# Patient Record
Sex: MALE | Race: OTHER | HISPANIC OR LATINO | URBAN - METROPOLITAN AREA
[De-identification: names, ages, dates, MRNs, and addresses within clinical notes are randomized per-mention and may not be internally consistent; named-entity substitution may affect disease eponyms.]

---

## 2021-07-31 ENCOUNTER — EMERGENCY (EMERGENCY)
Facility: HOSPITAL | Age: 41
LOS: 0 days | Discharge: HOME | End: 2021-07-31
Attending: EMERGENCY MEDICINE
Payer: MEDICAID

## 2021-07-31 VITALS
TEMPERATURE: 99 F | OXYGEN SATURATION: 97 % | DIASTOLIC BLOOD PRESSURE: 97 MMHG | SYSTOLIC BLOOD PRESSURE: 161 MMHG | HEART RATE: 66 BPM | RESPIRATION RATE: 18 BRPM

## 2021-07-31 VITALS
SYSTOLIC BLOOD PRESSURE: 152 MMHG | DIASTOLIC BLOOD PRESSURE: 97 MMHG | HEART RATE: 63 BPM | OXYGEN SATURATION: 98 % | TEMPERATURE: 97 F | RESPIRATION RATE: 16 BRPM

## 2021-07-31 DIAGNOSIS — K29.70 GASTRITIS, UNSPECIFIED, WITHOUT BLEEDING: ICD-10-CM

## 2021-07-31 DIAGNOSIS — R10.9 UNSPECIFIED ABDOMINAL PAIN: ICD-10-CM

## 2021-07-31 LAB
ALBUMIN SERPL ELPH-MCNC: 4.1 G/DL — SIGNIFICANT CHANGE UP (ref 3.3–5)
ALP SERPL-CCNC: 67 U/L — SIGNIFICANT CHANGE UP (ref 40–120)
ALT FLD-CCNC: 39 U/L — SIGNIFICANT CHANGE UP (ref 12–78)
ANION GAP SERPL CALC-SCNC: 4 MMOL/L — LOW (ref 5–17)
APTT BLD: 31.5 SEC — SIGNIFICANT CHANGE UP (ref 27.5–35.5)
AST SERPL-CCNC: 22 U/L — SIGNIFICANT CHANGE UP (ref 15–37)
BASOPHILS # BLD AUTO: 0.02 K/UL — SIGNIFICANT CHANGE UP (ref 0–0.2)
BASOPHILS NFR BLD AUTO: 0.2 % — SIGNIFICANT CHANGE UP (ref 0–2)
BILIRUB SERPL-MCNC: 0.9 MG/DL — SIGNIFICANT CHANGE UP (ref 0.2–1.2)
BUN SERPL-MCNC: 7 MG/DL — SIGNIFICANT CHANGE UP (ref 7–23)
CALCIUM SERPL-MCNC: 9 MG/DL — SIGNIFICANT CHANGE UP (ref 8.5–10.1)
CHLORIDE SERPL-SCNC: 106 MMOL/L — SIGNIFICANT CHANGE UP (ref 96–108)
CO2 SERPL-SCNC: 29 MMOL/L — SIGNIFICANT CHANGE UP (ref 22–31)
CREAT SERPL-MCNC: 0.95 MG/DL — SIGNIFICANT CHANGE UP (ref 0.5–1.3)
EOSINOPHIL # BLD AUTO: 0.03 K/UL — SIGNIFICANT CHANGE UP (ref 0–0.5)
EOSINOPHIL NFR BLD AUTO: 0.4 % — SIGNIFICANT CHANGE UP (ref 0–6)
GLUCOSE SERPL-MCNC: 107 MG/DL — HIGH (ref 70–99)
HCT VFR BLD CALC: 43 % — SIGNIFICANT CHANGE UP (ref 39–50)
HGB BLD-MCNC: 14.9 G/DL — SIGNIFICANT CHANGE UP (ref 13–17)
IMM GRANULOCYTES NFR BLD AUTO: 0.2 % — SIGNIFICANT CHANGE UP (ref 0–1.5)
INR BLD: 1.1 RATIO — SIGNIFICANT CHANGE UP (ref 0.88–1.16)
LIDOCAIN IGE QN: 78 U/L — SIGNIFICANT CHANGE UP (ref 73–393)
LYMPHOCYTES # BLD AUTO: 0.78 K/UL — LOW (ref 1–3.3)
LYMPHOCYTES # BLD AUTO: 9.3 % — LOW (ref 13–44)
MCHC RBC-ENTMCNC: 27.7 PG — SIGNIFICANT CHANGE UP (ref 27–34)
MCHC RBC-ENTMCNC: 34.7 GM/DL — SIGNIFICANT CHANGE UP (ref 32–36)
MCV RBC AUTO: 80.1 FL — SIGNIFICANT CHANGE UP (ref 80–100)
MONOCYTES # BLD AUTO: 0.74 K/UL — SIGNIFICANT CHANGE UP (ref 0–0.9)
MONOCYTES NFR BLD AUTO: 8.8 % — SIGNIFICANT CHANGE UP (ref 2–14)
NEUTROPHILS # BLD AUTO: 6.81 K/UL — SIGNIFICANT CHANGE UP (ref 1.8–7.4)
NEUTROPHILS NFR BLD AUTO: 81.1 % — HIGH (ref 43–77)
PLATELET # BLD AUTO: 168 K/UL — SIGNIFICANT CHANGE UP (ref 150–400)
POTASSIUM SERPL-MCNC: 3.3 MMOL/L — LOW (ref 3.5–5.3)
POTASSIUM SERPL-SCNC: 3.3 MMOL/L — LOW (ref 3.5–5.3)
PROT SERPL-MCNC: 7.9 GM/DL — SIGNIFICANT CHANGE UP (ref 6–8.3)
PROTHROM AB SERPL-ACNC: 12.8 SEC — SIGNIFICANT CHANGE UP (ref 10.6–13.6)
RBC # BLD: 5.37 M/UL — SIGNIFICANT CHANGE UP (ref 4.2–5.8)
RBC # FLD: 13 % — SIGNIFICANT CHANGE UP (ref 10.3–14.5)
SODIUM SERPL-SCNC: 139 MMOL/L — SIGNIFICANT CHANGE UP (ref 135–145)
WBC # BLD: 8.4 K/UL — SIGNIFICANT CHANGE UP (ref 3.8–10.5)
WBC # FLD AUTO: 8.4 K/UL — SIGNIFICANT CHANGE UP (ref 3.8–10.5)

## 2021-07-31 PROCEDURE — 85025 COMPLETE CBC W/AUTO DIFF WBC: CPT

## 2021-07-31 PROCEDURE — G1004: CPT

## 2021-07-31 PROCEDURE — 86901 BLOOD TYPING SEROLOGIC RH(D): CPT

## 2021-07-31 PROCEDURE — 85730 THROMBOPLASTIN TIME PARTIAL: CPT

## 2021-07-31 PROCEDURE — 74177 CT ABD & PELVIS W/CONTRAST: CPT | Mod: 26,ME

## 2021-07-31 PROCEDURE — 83690 ASSAY OF LIPASE: CPT

## 2021-07-31 PROCEDURE — 96374 THER/PROPH/DIAG INJ IV PUSH: CPT | Mod: XU

## 2021-07-31 PROCEDURE — 74177 CT ABD & PELVIS W/CONTRAST: CPT

## 2021-07-31 PROCEDURE — 80053 COMPREHEN METABOLIC PANEL: CPT

## 2021-07-31 PROCEDURE — 86850 RBC ANTIBODY SCREEN: CPT

## 2021-07-31 PROCEDURE — 99285 EMERGENCY DEPT VISIT HI MDM: CPT

## 2021-07-31 PROCEDURE — 99284 EMERGENCY DEPT VISIT MOD MDM: CPT | Mod: 25

## 2021-07-31 PROCEDURE — 86900 BLOOD TYPING SEROLOGIC ABO: CPT

## 2021-07-31 PROCEDURE — 85610 PROTHROMBIN TIME: CPT

## 2021-07-31 PROCEDURE — 36415 COLL VENOUS BLD VENIPUNCTURE: CPT

## 2021-07-31 PROCEDURE — 96375 TX/PRO/DX INJ NEW DRUG ADDON: CPT

## 2021-07-31 RX ORDER — KETOROLAC TROMETHAMINE 30 MG/ML
30 SYRINGE (ML) INJECTION ONCE
Refills: 0 | Status: DISCONTINUED | OUTPATIENT
Start: 2021-07-31 | End: 2021-07-31

## 2021-07-31 RX ORDER — FAMOTIDINE 10 MG/ML
20 INJECTION INTRAVENOUS ONCE
Refills: 0 | Status: COMPLETED | OUTPATIENT
Start: 2021-07-31 | End: 2021-07-31

## 2021-07-31 RX ORDER — ONDANSETRON 8 MG/1
4 TABLET, FILM COATED ORAL ONCE
Refills: 0 | Status: COMPLETED | OUTPATIENT
Start: 2021-07-31 | End: 2021-07-31

## 2021-07-31 RX ORDER — POTASSIUM CHLORIDE 20 MEQ
40 PACKET (EA) ORAL DAILY
Refills: 0 | Status: DISCONTINUED | OUTPATIENT
Start: 2021-07-31 | End: 2021-07-31

## 2021-07-31 RX ORDER — SODIUM CHLORIDE 9 MG/ML
1000 INJECTION INTRAMUSCULAR; INTRAVENOUS; SUBCUTANEOUS ONCE
Refills: 0 | Status: COMPLETED | OUTPATIENT
Start: 2021-07-31 | End: 2021-07-31

## 2021-07-31 RX ADMIN — Medication 30 MILLIGRAM(S): at 15:09

## 2021-07-31 RX ADMIN — ONDANSETRON 4 MILLIGRAM(S): 8 TABLET, FILM COATED ORAL at 15:09

## 2021-07-31 RX ADMIN — SODIUM CHLORIDE 1000 MILLILITER(S): 9 INJECTION INTRAMUSCULAR; INTRAVENOUS; SUBCUTANEOUS at 15:09

## 2021-07-31 RX ADMIN — FAMOTIDINE 20 MILLIGRAM(S): 10 INJECTION INTRAVENOUS at 15:09

## 2021-07-31 RX ADMIN — Medication 40 MILLIEQUIVALENT(S): at 18:17

## 2021-07-31 RX ADMIN — SODIUM CHLORIDE 1000 MILLILITER(S): 9 INJECTION INTRAMUSCULAR; INTRAVENOUS; SUBCUTANEOUS at 18:18

## 2021-07-31 NOTE — ED STATDOCS - CLINICAL SUMMARY MEDICAL DECISION MAKING FREE TEXT BOX
cellulitis of left buttock, no abscess. Pt non toxic, will DC on PO abx. warm compresses. f/u [facility] [place] for re-eval and repeat labs. upper abd pain, now resolved, likely gastritis. f/u with outpt [facility].

## 2021-07-31 NOTE — ED STATDOCS - PATIENT PORTAL LINK FT
You can access the FollowMyHealth Patient Portal offered by [facility] by registering at the following website: [email or web address]. By joining [employer]’s FollowMyHealth portal, you will also be able to view your health information using other applications (apps) compatible with our system.

## 2021-07-31 NOTE — ED STATDOCS - CARE PLAN
Principal Discharge DX:	Cellulitis of buttock  Secondary Diagnosis:	Buttock pain   Principal Discharge DX:	Abdominal pain  Secondary Diagnosis:	Gastritis

## 2021-07-31 NOTE — ED STATDOCS - PROGRESS NOTE DETAILS
signed Terri Vanegas PA-C Pt seen initially in intake by Dr Wolf.  ID [id number]  40M c/o upper abd pain since 1am last night. Denies fever/ N/V/D. Pt says he had some kind of fistula surgery years ago but he isn't sure from what. Pt in obvious pain, +upper abd TTP. Denies urinary symptoms. Plan labs, CT, analgesia. Pt agrees with plan of  care. No PMD. signed Terri Vanegas PA-C   CT no drainable abscess. labs notable for elevated creatinine, lactate 2.1. Will start pt on PO abx, keflex/doxy. outpt f/u [facility] for re-eval and repeat labs. return precautions given. Pt feeling well at DC, agrees with DC and plan of care. signed Terri Vanegas PA-C  ID signed Terri Vanegas PA-C  ID [id number]  No significant findings on labwork or imaging. pt feeling much better after meds. pt would like to DC home. abdomen soft, non-tender. pain possibly due to gastritis. recommend OTC pepcid, f/u [facility]. Pt feeling well at DC, agrees with DC and plan of care.

## 2021-07-31 NOTE — ED STATDOCS - NSFOLLOWUPINSTRUCTIONS_ED_ALL_ED_FT
Cellulitis    WHAT YOU NEED TO KNOW:    Cellulitis is a skin infection caused by bacteria. Cellulitis may go away on its own or you may need treatment. Your healthcare provider may draw a [place] around the outside edges of your cellulitis. If your cellulitis spreads, your healthcare provider will see it outside of the [place]. Cellulitis          DISCHARGE INSTRUCTIONS:    Call 911 if:     You have sudden trouble breathing or chest pain.        Return to the emergency department if:     Your wound gets larger and more painful.       You feel a crackling under your skin when you touch it.      You have purple dots or bumps on your skin, or you see bleeding under your skin.      You have new swelling and pain in your legs.      The red, warm, swollen area gets larger.      You see red streaks coming from the infected area.    Contact your healthcare provider if:     You have a fever.      Your fever or pain does not go away or gets worse.      The area does not get smaller after 2 days of antibiotics.      Your skin is flaking or peeling off.      You have questions or concerns about your condition or care.    Medicines:     Antibiotics help treat the bacterial infection.       NSAIDs, such as ibuprofen, help decrease swelling, pain, and fever. NSAIDs can cause stomach bleeding or kidney problems in certain people. If you take blood thinner medicine, always ask if NSAIDs are safe for you. Always read the medicine label and follow directions. Do not give these medicines to children under 6 months of age without direction from your child's healthcare provider.      Acetaminophen decreases pain and fever. It is available without a doctor's order. Ask how much to take and how often to take it. Follow directions. Read the labels of all other medicines you are using to see if they also contain acetaminophen, or ask your doctor or pharmacist. Acetaminophen can cause liver damage if not taken correctly. Do not use more than 4 grams (4,000 milligrams) total of acetaminophen in one day.       Take your medicine as directed. Contact your healthcare provider if you think your medicine is not helping or if you have side effects. Tell him or her if you are allergic to any medicine. Keep a list of the medicines, vitamins, and herbs you take. Include the amounts, and when and why you take them. Bring the list or the pill bottles to follow-up visits. Carry your medicine list with you in case of an emergency.    Self-care:     Elevate the area above the level of your heart as often as you can. This will help decrease swelling and pain. Prop the area on pillows or blankets to keep it elevated comfortably.       Clean the area daily until the wound scabs over. Gently wash the area with soap and water. Pat dry. Use dressings as directed.       Place cool or warm, wet cloths on the area as directed. Use clean cloths and clean water. Leave it on the area until the cloth is room temperature. Pat the area dry with a clean, dry cloth. The cloths may help decrease pain.     Prevent cellulitis:     Do not scratch bug bites or areas of injury. You increase your risk for cellulitis by scratching these areas.       Do not share personal items, such as towels, clothing, and razors.       Clean exercise equipment with germ-killing  before and after you use it.      Wash your hands often. Use soap and water. Wash your hands after you use the bathroom, change a child's diapers, or sneeze. Wash your hands before you prepare or eat food. Use lotion to prevent dry, cracked skin. Handwashing           Wear pressure stockings as directed. You may be told to wear the stockings if you have peripheral edema. The stockings improve blood flow and decrease swelling.      Treat athlete’s foot. This can help prevent the spread of a bacterial skin infection.    Follow up with your healthcare provider within 3 days, or as directed: Your healthcare provider will check if your cellulitis is getting better. You may need different medicine. Write down your questions so you remember to ask them during your visits.     FOLLOW UP AT THE [facility] [place] [facility] THIS WEEK FOR RE-EVALUATION AND TO REPEAT YOUR BLOODWORK. YOUR KIDNEY NUMBERS WERE A LITTLE HIGH AND YOUR POTASSIUM WAS A LITTLE LOW. CALL THE OFFICE TO MAKE AN APPOINTMENT. RETURN TO ER FOR ANY WORSENING SYMPTOMS OR NEW CONCERNS. Gastritis - Adultos    Gastritis, Adult    La gastritis es la inflamación del estómago. Hay dos tipos de gastritis:  •Gastritis aguda. Dena tipo aparece de manera repentina.      •Gastritis crónica. Dena tipo es mucho más frecuente y dura mucho tiempo.      La gastritis se manifiesta cuando el revestimiento del estómago se debilita o se lesiona. Sin tratamiento, la gastritis puede causar sangrado y úlceras estomacales.      ¿Cuáles son las causas?  Esta afección puede ser causada por lo siguiente:  •Infección.      •Beber alcohol en exceso.      •Ciertos medicamentos. Estos incluyen corticoesteroides, antibióticos y algunos medicamentos de venta sin receta, gaby aspirina o ibuprofeno.      •Hay demasiado ácido en el estómago.      •Heather enfermedad del intestino o del estómago.      •Estrés.      •Heather reacción alérgica.      •Enfermedad de Crohn.      •Algunos tratamientos para el cáncer (radiación).      A veces, se desconoce la causa de esta afección.      ¿Cuáles son los signos o los síntomas?  Los síntomas de esta afección incluyen los siguientes:  •Dolor o sensación de ardor en la parte superior del abdomen.      •Náuseas.      •Vómitos.      •Sensación molesta de saciedad después de comer.      •Pérdida de peso.      •Mal aliento.      •Marilu en el vómito o las heces.      En algunos casos, no hay síntomas.      ¿Cómo se diagnostica?  Esta afección puede diagnosticarse en función de lo siguiente:  •Dipti antecedentes médicos y heather descripción de dipti síntomas.      •Un examen físico.    •Estudios. Estos pueden incluir los siguientes:  •Análisis de marilu.      •Pruebas de heces.      •Un estudio en el que se introduce un instrumento woodall y flexible con heather heber y heather pequeña cámara a través del esófago hasta el estómago (endoscopía radha).      •Un estudio en el cual se demetrius heather muestra de tejido para analizarlo (biopsia).          ¿Cómo se trata?  Esta afección se puede tratar con medicamentos. Los medicamentos que se utilizan varían según la causa de la gastritis:  •Si la afección se debe a heather infección bacteriana, pueden recetarle medicamentos antibióticos.      •Si la afección se debe al exceso de ácido estomacal, se pueden administrar medicamentos denominados bloqueadores H2, inhibidores de la bomba de protones o antiácidos.      El tratamiento puede también incluir interrumpir el uso de ciertos medicamentos gaby aspirina, ibuprofeno u otros antiinflamatorios no esteroideos (JANETTE).      Siga estas indicaciones en silva casa:    Medicamentos     •[place] los medicamentos de venta aj y los recetados solamente gaby se lo haya indicado el médico.      •Si le recetaron un antibiótico, tómelo gaby se lo haya indicado el médico. No deje de thanh el antibiótico, aunque comience a sentirse mejor.        Comida y bebida      •Damari comidas pequeñas y frecuentes carlos alberto el día en lugar de comidas abundantes.      •Evite los alimentos y las bebidas que intensifican los síntomas.      •Quita suficiente líquido gaby para mantener la orina de color amarillo pálido.      Consumo de alcohol   • No quita alcohol si:  •Silva médico le indica no hacerlo.      •Está embarazada, puede estar embarazada o está tratando de quedar embarazada.      •Si joel alcohol:•Limite silva uso a las siguientes medidas:  •De 0 a 1 medida por día para las mujeres.      •De 0 a 2 medidas por día para los hombres.        •Esté atento a la cantidad de alcohol que hay en las bebidas que demetrius. En los [place], heather medida equivale a heather botella de cerveza de 12 oz (355 ml), un vaso de vino de 5 oz (148 ml) o un vaso de heather bebida alcohólica de radha graduación de 1½ oz (44 ml).        Indicaciones generales     •Hable con el médico sobre las formas de controlar el estrés, gaby realizar ejercicio con regularidad o practicar respiración profunda, meditación o yoga.      • No consuma ningún producto que contenga nicotina o tabaco, gaby cigarrillos y cigarrillos electrónicos. Si necesita ayuda para dejar de fumar, consulte al médico.      •Concurra a todas las visitas de seguimiento gaby se lo haya indicado el médico. [place]inger es importante.        Comuníquese con un médico si:    •Dipti síntomas empeoran.      •Los síntomas regresan después del tratamiento.        Solicite ayuda inmediatamente si:    •Vomita marilu de color arora brillante o heather sustancia similar a los granos de café.      •Las heces son negras o de color arora oscuro.      •No puede retener los líquidos.      •El dolor abdominal empeora.      •Tiene fiebre.      •No se siente mejor luego de heather semana.        Resumen    •La gastritis es la inflamación del revestimiento del estómago que puede ocurrir de manera repentina (aguda) o desarrollarse lentamente con el tiempo (crónica).      •Esta afección se diagnostica mediante los antecedentes médicos, un examen físico o estudios.      •Esta afección puede tratarse con medicamentos para tratar la infección o medicamentos para reducir la cantidad de ácido en el estómago.      •Siga las indicaciones del médico acerca de thanh medicamentos, hacer cambios en la dieta y saber cuándo pedir ayuda.      Esta información no tiene gaby fin reemplazar el consejo del médico. Asegúrese de hacerle al médico cualquier pregunta que tenga.      Document Revised: 06/26/2019 Document Reviewed: 06/26/2019    Elsevier Patient Education © 2021 Elsevier Inc.    SIGUE A TU MÉDICO EN 1-2 DÍAS. REGRESE A LA ER PARA CUALQUIER SÍNTOMA PENDIENTE O NUEVAS PREOCUPACIONES.

## 2021-07-31 NOTE — ED STATDOCS - OBJECTIVE STATEMENT
39 y/o M with no significant PMHx presents to the ED c/o non radiating, generalized abd pain since 1 AM, located around the front. Ate a piece of cake aroudn 9PM yesterday night and started having constant abd pain at 1AM. Denies vomiting and diarrhea and fever. (+)nausea. States he tries to go to the bathroom but has small BM.  Has never had similar abd pain in the past. Reports he had a low grade fever yesterday and took Tylenol after he got his COVID vaccination.  Pt reports he had a fistula put in.  No daily meds. NKDA. No ETOH consumption. #: [id number]

## 2021-07-31 NOTE — ED STATDOCS - NSFOLLOWUPCLINICS_GEN_ALL_ED_FT
Critical access hospital  Family Medicine  [address] [place] [address]  Phone: [phone]  Fax:

## 2021-07-31 NOTE — ED ADULT NURSE NOTE - NSIMPLEMENTINTERV_GEN_ALL_ED
Implemented All Universal Safety Interventions:  Parachute to call system. Call bell, personal items and telephone within reach. Instruct patient to call for assistance. Room bathroom lighting operational. Non-slip footwear when patient is off stretcher. Physically safe environment: no spills, clutter or unnecessary equipment. Stretcher in lowest position, wheels locked, appropriate side rails in place.

## 2021-07-31 NOTE — ED ADULT NURSE NOTE - OBJECTIVE STATEMENT
pt presents to the ED c/o non radiating, generalized abd pain since 1 AM, located around the front. Ate a piece of cake aroudn 9PM yesterday night and started having constant abd pain at 1AM. Denies vomiting and diarrhea and fever. (+)nausea. States he tries to go to the bathroom but has small BM.  Has never had similar abd pain in the past. Reports he had a low grade fever yesterday and took Tylenol after he got his COVID vaccination. 18 g placed to left AC. labs sent.

## 2024-08-19 ENCOUNTER — INPATIENT (INPATIENT)
Facility: HOSPITAL | Age: 44
LOS: 7 days | Discharge: HOME | DRG: 263 | End: 2024-08-27
Attending: FAMILY MEDICINE | Admitting: FAMILY MEDICINE
Payer: MEDICAID

## 2024-08-19 VITALS
RESPIRATION RATE: 18 BRPM | DIASTOLIC BLOOD PRESSURE: 86 MMHG | OXYGEN SATURATION: 100 % | HEART RATE: 49 BPM | WEIGHT: 176.59 LBS | TEMPERATURE: 97 F | SYSTOLIC BLOOD PRESSURE: 135 MMHG

## 2024-08-19 DIAGNOSIS — K85.90 ACUTE PANCREATITIS WITHOUT NECROSIS OR INFECTION, UNSPECIFIED: ICD-10-CM

## 2024-08-19 DIAGNOSIS — Z59.7 INSUFFICIENT SOCIAL INSURANCE AND WELFARE SUPPORT: ICD-10-CM

## 2024-08-19 DIAGNOSIS — Z87.19 PERSONAL HISTORY OF OTHER DISEASES OF THE DIGESTIVE SYSTEM: ICD-10-CM

## 2024-08-19 DIAGNOSIS — K85.10 BILIARY ACUTE PANCREATITIS WITHOUT NECROSIS OR INFECTION: ICD-10-CM

## 2024-08-19 DIAGNOSIS — D69.6 THROMBOCYTOPENIA, UNSPECIFIED: ICD-10-CM

## 2024-08-19 DIAGNOSIS — E86.0 DEHYDRATION: ICD-10-CM

## 2024-08-19 DIAGNOSIS — I44.1 ATRIOVENTRICULAR BLOCK, SECOND DEGREE: ICD-10-CM

## 2024-08-19 DIAGNOSIS — K80.66 CALCULUS OF GALLBLADDER AND BILE DUCT WITH ACUTE AND CHRONIC CHOLECYSTITIS WITHOUT OBSTRUCTION: ICD-10-CM

## 2024-08-19 DIAGNOSIS — I10 ESSENTIAL (PRIMARY) HYPERTENSION: ICD-10-CM

## 2024-08-19 LAB
ALBUMIN SERPL ELPH-MCNC: 4.2 G/DL — SIGNIFICANT CHANGE UP (ref 3.3–5)
ALP SERPL-CCNC: 185 U/L — HIGH (ref 40–120)
ALT FLD-CCNC: 669 U/L — HIGH (ref 12–78)
ANION GAP SERPL CALC-SCNC: 5 MMOL/L — SIGNIFICANT CHANGE UP (ref 5–17)
APTT BLD: 35.5 SEC — SIGNIFICANT CHANGE UP (ref 24.5–35.6)
AST SERPL-CCNC: 449 U/L — HIGH (ref 15–37)
BASOPHILS # BLD AUTO: 0.04 K/UL — SIGNIFICANT CHANGE UP (ref 0–0.2)
BASOPHILS NFR BLD AUTO: 0.3 % — SIGNIFICANT CHANGE UP (ref 0–2)
BILIRUB SERPL-MCNC: 6 MG/DL — HIGH (ref 0.2–1.2)
BLD GP AB SCN SERPL QL: SIGNIFICANT CHANGE UP
BUN SERPL-MCNC: 9 MG/DL — SIGNIFICANT CHANGE UP (ref 7–23)
CALCIUM SERPL-MCNC: 9.3 MG/DL — SIGNIFICANT CHANGE UP (ref 8.5–10.1)
CHLORIDE SERPL-SCNC: 105 MMOL/L — SIGNIFICANT CHANGE UP (ref 96–108)
CO2 SERPL-SCNC: 30 MMOL/L — SIGNIFICANT CHANGE UP (ref 22–31)
CREAT SERPL-MCNC: 1.28 MG/DL — SIGNIFICANT CHANGE UP (ref 0.5–1.3)
EGFR: 71 ML/MIN/1.73M2 — SIGNIFICANT CHANGE UP
EOSINOPHIL # BLD AUTO: 0.03 K/UL — SIGNIFICANT CHANGE UP (ref 0–0.5)
EOSINOPHIL NFR BLD AUTO: 0.2 % — SIGNIFICANT CHANGE UP (ref 0–6)
GLUCOSE SERPL-MCNC: 144 MG/DL — HIGH (ref 70–99)
HCT VFR BLD CALC: 49.9 % — SIGNIFICANT CHANGE UP (ref 39–50)
HGB BLD-MCNC: 17 G/DL — SIGNIFICANT CHANGE UP (ref 13–17)
IMM GRANULOCYTES NFR BLD AUTO: 0.4 % — SIGNIFICANT CHANGE UP (ref 0–0.9)
INR BLD: 1.06 RATIO — SIGNIFICANT CHANGE UP (ref 0.85–1.18)
LACTATE SERPL-SCNC: 1.6 MMOL/L — SIGNIFICANT CHANGE UP (ref 0.7–2)
LG PLATELETS BLD QL AUTO: SLIGHT — SIGNIFICANT CHANGE UP
LIDOCAIN IGE QN: 3975 U/L — HIGH (ref 13–75)
LYMPHOCYTES # BLD AUTO: 0.39 K/UL — LOW (ref 1–3.3)
LYMPHOCYTES # BLD AUTO: 2.9 % — LOW (ref 13–44)
MANUAL SMEAR VERIFICATION: SIGNIFICANT CHANGE UP
MCHC RBC-ENTMCNC: 28.3 PG — SIGNIFICANT CHANGE UP (ref 27–34)
MCHC RBC-ENTMCNC: 34.1 GM/DL — SIGNIFICANT CHANGE UP (ref 32–36)
MCV RBC AUTO: 83.2 FL — SIGNIFICANT CHANGE UP (ref 80–100)
MONOCYTES # BLD AUTO: 0.7 K/UL — SIGNIFICANT CHANGE UP (ref 0–0.9)
MONOCYTES NFR BLD AUTO: 5.2 % — SIGNIFICANT CHANGE UP (ref 2–14)
NEUTROPHILS # BLD AUTO: 12.35 K/UL — HIGH (ref 1.8–7.4)
NEUTROPHILS NFR BLD AUTO: 91 % — HIGH (ref 43–77)
PLAT MORPH BLD: ABNORMAL
PLATELET # BLD AUTO: 142 K/UL — LOW (ref 150–400)
PLATELET COUNT - ESTIMATE: NORMAL — SIGNIFICANT CHANGE UP
POTASSIUM SERPL-MCNC: 4.3 MMOL/L — SIGNIFICANT CHANGE UP (ref 3.5–5.3)
POTASSIUM SERPL-SCNC: 4.3 MMOL/L — SIGNIFICANT CHANGE UP (ref 3.5–5.3)
PROT SERPL-MCNC: 7.9 GM/DL — SIGNIFICANT CHANGE UP (ref 6–8.3)
PROTHROM AB SERPL-ACNC: 12 SEC — SIGNIFICANT CHANGE UP (ref 9.5–13)
RBC # BLD: 6 M/UL — HIGH (ref 4.2–5.8)
RBC # FLD: 12.9 % — SIGNIFICANT CHANGE UP (ref 10.3–14.5)
RBC BLD AUTO: NORMAL — SIGNIFICANT CHANGE UP
SODIUM SERPL-SCNC: 140 MMOL/L — SIGNIFICANT CHANGE UP (ref 135–145)
WBC # BLD: 13.57 K/UL — HIGH (ref 3.8–10.5)
WBC # FLD AUTO: 13.57 K/UL — HIGH (ref 3.8–10.5)

## 2024-08-19 PROCEDURE — 99223 1ST HOSP IP/OBS HIGH 75: CPT

## 2024-08-19 PROCEDURE — 93306 TTE W/DOPPLER COMPLETE: CPT

## 2024-08-19 PROCEDURE — C9399: CPT

## 2024-08-19 PROCEDURE — 93010 ELECTROCARDIOGRAM REPORT: CPT

## 2024-08-19 PROCEDURE — 76376 3D RENDER W/INTRP POSTPROCES: CPT

## 2024-08-19 PROCEDURE — 36415 COLL VENOUS BLD VENIPUNCTURE: CPT

## 2024-08-19 PROCEDURE — 47562 LAPAROSCOPIC CHOLECYSTECTOMY: CPT | Mod: AS

## 2024-08-19 PROCEDURE — 85610 PROTHROMBIN TIME: CPT

## 2024-08-19 PROCEDURE — 93005 ELECTROCARDIOGRAM TRACING: CPT

## 2024-08-19 PROCEDURE — C1889: CPT

## 2024-08-19 PROCEDURE — 88304 TISSUE EXAM BY PATHOLOGIST: CPT

## 2024-08-19 PROCEDURE — 86900 BLOOD TYPING SEROLOGIC ABO: CPT

## 2024-08-19 PROCEDURE — S2900: CPT

## 2024-08-19 PROCEDURE — 71045 X-RAY EXAM CHEST 1 VIEW: CPT

## 2024-08-19 PROCEDURE — 80053 COMPREHEN METABOLIC PANEL: CPT

## 2024-08-19 PROCEDURE — 99285 EMERGENCY DEPT VISIT HI MDM: CPT

## 2024-08-19 PROCEDURE — 83690 ASSAY OF LIPASE: CPT

## 2024-08-19 PROCEDURE — 85730 THROMBOPLASTIN TIME PARTIAL: CPT

## 2024-08-19 PROCEDURE — 86140 C-REACTIVE PROTEIN: CPT

## 2024-08-19 PROCEDURE — 76000 FLUOROSCOPY <1 HR PHYS/QHP: CPT

## 2024-08-19 PROCEDURE — 71045 X-RAY EXAM CHEST 1 VIEW: CPT | Mod: 26

## 2024-08-19 PROCEDURE — 74177 CT ABD & PELVIS W/CONTRAST: CPT | Mod: 26,MC

## 2024-08-19 PROCEDURE — 84100 ASSAY OF PHOSPHORUS: CPT

## 2024-08-19 PROCEDURE — 85025 COMPLETE CBC W/AUTO DIFF WBC: CPT

## 2024-08-19 PROCEDURE — 83735 ASSAY OF MAGNESIUM: CPT

## 2024-08-19 PROCEDURE — C1769: CPT

## 2024-08-19 PROCEDURE — C2625: CPT

## 2024-08-19 PROCEDURE — 86901 BLOOD TYPING SEROLOGIC RH(D): CPT

## 2024-08-19 PROCEDURE — 86850 RBC ANTIBODY SCREEN: CPT

## 2024-08-19 PROCEDURE — C9290: CPT

## 2024-08-19 PROCEDURE — 85027 COMPLETE CBC AUTOMATED: CPT

## 2024-08-19 PROCEDURE — 76705 ECHO EXAM OF ABDOMEN: CPT | Mod: 26

## 2024-08-19 PROCEDURE — 82248 BILIRUBIN DIRECT: CPT

## 2024-08-19 RX ORDER — DEXTROSE, SODIUM ACETATE, POTASSIUM CHLORIDE, POTASSIUM PHOSPHATE, AND SODIUM CHLORIDE 5; .15; .13; .28; .091 G/100ML; G/100ML; G/100ML; G/100ML; G/100ML
2000 INJECTION, SOLUTION INTRAVENOUS
Refills: 0 | Status: DISCONTINUED | OUTPATIENT
Start: 2024-08-19 | End: 2024-08-20

## 2024-08-19 RX ORDER — PANTOPRAZOLE SODIUM 40 MG
40 TABLET, DELAYED RELEASE (ENTERIC COATED) ORAL
Refills: 0 | Status: DISCONTINUED | OUTPATIENT
Start: 2024-08-19 | End: 2024-08-26

## 2024-08-19 RX ORDER — OMEPRAZOLE 40 MG/1
1 CAPSULE, DELAYED RELEASE ORAL
Refills: 0 | DISCHARGE

## 2024-08-19 RX ORDER — ONDANSETRON 2 MG/ML
4 INJECTION, SOLUTION INTRAMUSCULAR; INTRAVENOUS ONCE
Refills: 0 | Status: COMPLETED | OUTPATIENT
Start: 2024-08-19 | End: 2024-08-19

## 2024-08-19 RX ORDER — SODIUM CHLORIDE 9 MG/ML
1000 INJECTION INTRAMUSCULAR; INTRAVENOUS; SUBCUTANEOUS ONCE
Refills: 0 | Status: COMPLETED | OUTPATIENT
Start: 2024-08-19 | End: 2024-08-19

## 2024-08-19 RX ORDER — ONDANSETRON 2 MG/ML
4 INJECTION, SOLUTION INTRAMUSCULAR; INTRAVENOUS EVERY 6 HOURS
Refills: 0 | Status: DISCONTINUED | OUTPATIENT
Start: 2024-08-19 | End: 2024-08-27

## 2024-08-19 RX ORDER — MAGNESIUM, ALUMINUM HYDROXIDE 200-225/5
30 SUSPENSION, ORAL (FINAL DOSE FORM) ORAL ONCE
Refills: 0 | Status: COMPLETED | OUTPATIENT
Start: 2024-08-19 | End: 2024-08-19

## 2024-08-19 RX ORDER — PIPERACILLIN SODIUM AND TAZOBACTAM SODIUM 3; .375 G/15ML; G/15ML
3.38 INJECTION, POWDER, FOR SOLUTION INTRAVENOUS ONCE
Refills: 0 | Status: COMPLETED | OUTPATIENT
Start: 2024-08-19 | End: 2024-08-19

## 2024-08-19 RX ORDER — FAMOTIDINE 10 MG/ML
20 INJECTION INTRAVENOUS ONCE
Refills: 0 | Status: COMPLETED | OUTPATIENT
Start: 2024-08-19 | End: 2024-08-19

## 2024-08-19 RX ORDER — THIAMINE HCL 250 MG
100 TABLET ORAL DAILY
Refills: 0 | Status: DISCONTINUED | OUTPATIENT
Start: 2024-08-19 | End: 2024-08-27

## 2024-08-19 RX ORDER — FOLIC ACID 1 MG
1 TABLET ORAL DAILY
Refills: 0 | Status: DISCONTINUED | OUTPATIENT
Start: 2024-08-19 | End: 2024-08-27

## 2024-08-19 RX ORDER — ACETAMINOPHEN 325 MG/1
1000 TABLET ORAL ONCE
Refills: 0 | Status: COMPLETED | OUTPATIENT
Start: 2024-08-19 | End: 2024-08-19

## 2024-08-19 RX ADMIN — ACETAMINOPHEN 1000 MILLIGRAM(S): 325 TABLET ORAL at 18:15

## 2024-08-19 RX ADMIN — Medication 4 MILLIGRAM(S): at 20:48

## 2024-08-19 RX ADMIN — SODIUM CHLORIDE 1000 MILLILITER(S): 9 INJECTION INTRAMUSCULAR; INTRAVENOUS; SUBCUTANEOUS at 17:33

## 2024-08-19 RX ADMIN — ONDANSETRON 4 MILLIGRAM(S): 2 INJECTION, SOLUTION INTRAMUSCULAR; INTRAVENOUS at 17:24

## 2024-08-19 RX ADMIN — Medication 30 MILLILITER(S): at 17:33

## 2024-08-19 RX ADMIN — ACETAMINOPHEN 400 MILLIGRAM(S): 325 TABLET ORAL at 17:33

## 2024-08-19 RX ADMIN — PIPERACILLIN SODIUM AND TAZOBACTAM SODIUM 200 GRAM(S): 3; .375 INJECTION, POWDER, FOR SOLUTION INTRAVENOUS at 18:22

## 2024-08-19 RX ADMIN — SODIUM CHLORIDE 1000 MILLILITER(S): 9 INJECTION INTRAMUSCULAR; INTRAVENOUS; SUBCUTANEOUS at 18:22

## 2024-08-19 RX ADMIN — ONDANSETRON 4 MILLIGRAM(S): 2 INJECTION, SOLUTION INTRAMUSCULAR; INTRAVENOUS at 20:56

## 2024-08-19 RX ADMIN — FAMOTIDINE 20 MILLIGRAM(S): 10 INJECTION INTRAVENOUS at 17:33

## 2024-08-19 SDOH — ECONOMIC STABILITY - INCOME SECURITY: INSUFFICIENT SOCIAL INSURANCE AND WELFARE SUPPORT: Z59.7

## 2024-08-19 NOTE — H&P ADULT - NSHPPHYSICALEXAM_GEN_ALL_CORE
ICU Vital Signs Last 24 Hrs  T(C): 36.3 (19 Aug 2024 16:05), Max: 36.3 (19 Aug 2024 16:05)  T(F): 97.4 (19 Aug 2024 16:05), Max: 97.4 (19 Aug 2024 16:05)  HR: 50 (19 Aug 2024 20:40) (49 - 50)  BP: 142/91 (19 Aug 2024 20:40) (135/86 - 142/91)    RR: 17 (19 Aug 2024 20:40) (17 - 18)  SpO2: 100% (19 Aug 2024 20:40) (100% - 100%)    O2 Parameters below as of 19 Aug 2024 20:40  Patient On (Oxygen Delivery Method): room air

## 2024-08-19 NOTE — CONSULT NOTE ADULT - ASSESSMENT
43 Y/M with epigastric pain found to have acute intersitial pancreatitis, cholelithiasis and possible choledocholithiasis   Leukocytosis 13.57, Lipase: 3975  Elevated  TB:6, Transaminitis    Plan:  -Rec medical admission  -Will plan for lap Tonia on this admission  -Rec GI consult for ERCP  -Diet: NPO  -pain control   -Monitor vitals  -Monitor bowel function  -Anti-emetic prn   -Adequate hydration and IVF   -Encourage ambulation  -Surgery will follow

## 2024-08-19 NOTE — ED STATDOCS - PHYSICAL EXAMINATION
GEN: Patient awake alert NAD.   HEENT: normocephalic, atraumatic, EOMI, no scleral icterus, moist MM  CARDIAC: RRR, S1, S2, no murmur.   PULM: CTA B/L no wheeze, rhonchi, rales.   ABD: soft ND, no rebound no guarding, no CVA tenderness. Epigastric and RUQ TTP  MSK: Moving all extremities, no edema. 5/5 strength and full ROM in all extremities.     NEURO: A&Ox3, gait normal, no focal neurological deficits, CN 2-12 grossly intact  SKIN: warm, dry, no rash. GEN: Patient awake alert NAD.   HEENT: normocephalic, atraumatic, EOMI, no scleral icterus, moist MM  CARDIAC: RRR, S1, S2, no murmur.   PULM: CTA B/L no wheeze, rhonchi, rales.   ABD: soft ND, no rebound no guarding, no CVA tenderness. Epigastric and RUQ TTP  MSK: Moving all extremities, no edema.     NEURO: A&Ox3, gait normal, no focal neurological deficits,  SKIN: warm, dry, no rash.

## 2024-08-19 NOTE — H&P ADULT - ASSESSMENT
Pt is adm w/    weakness  severe abd pain  Pancreatitis with transaminitis,  likely due to gallstones  Nausea/ vomiting  Dehydration  Leukocytosis  Thrombocytopenia  Hypertension  Bradycardia on ED telemetry HR 40's,  asymptomatic     Hx of gastritis  Hx of perianal fistula  Hx of HTN on dietary /lifestyle modification    Abn CT scan shows  :   BILE DUCTS: Mild intrahepatic and extra hepatic biliary ductal   dilatation. CBD measures 10 mm in diameter.  GALLBLADDER: Distended gallbladder.  SPLEEN: Within normal limits.  PANCREAS: Tenderness pancreas. Moderate peripancreatic fluid. No   peripancreatic fluid collection. No pancreatic ductal dilatation.      ( see full report)     - admit to med, remote telemetry due to bradycardia  - s/p 2 L NS in the ED, cont  IVF,  will add 175ml/hr   - s/p Zosyn iv in ED, cont and add flagyl iv  -  repeat labs, lipid panel, cbc  - NPO, possible surg  - pain control  - supportive care  - apprec surg consult by Dr. Romo and team  - GI consult   - will add norvasv 5mg for HTN  - outpt f/u with PCP for HTN  - DVT proph: SCDS,  ambulation.  If pt does not ambulate, add lovenox  - Full Code   Pt is adm w/    weakness  severe abd pain  Pancreatitis with transaminitis,  likely due to gallstones  Nausea/ vomiting  Dehydration  Leukocytosis  Thrombocytopenia  Hypertension  Bradycardia on ED telemetry HR 40's - 50's,  asymptomatic     Hx of gastritis  Hx of perianal fistula  Hx of HTN on dietary /lifestyle modification    Abn CT scan shows  :   BILE DUCTS: Mild intrahepatic and extra hepatic biliary ductal   dilatation. CBD measures 10 mm in diameter.  GALLBLADDER: Distended gallbladder.  SPLEEN: Within normal limits.  PANCREAS: Tenderness pancreas. Moderate peripancreatic fluid. No   peripancreatic fluid collection. No pancreatic ductal dilatation.      ( see full report)     - admit to med, remote telemetry due to bradycardia  - s/p 2 L NS in the ED, cont  IVF,  will add 175ml/hr   - s/p Zosyn iv in ED, cont for now and may discont if no signs of infection  -  repeat labs, lipid panel, cbc, crp  - NPO, possible surg  - pain control  - supportive care  - apprec surg consult by Dr. Romo and team  - GI consult , possible ERCP  - will add norvasv 5mg for HTN  - outpt f/u with PCP for HTN  - DVT proph: SCDS,  ambulation.  If pt does not ambulate, add lovenox  - Full Code

## 2024-08-19 NOTE — ED STATDOCS - DATE/TIME 2
My signature below certifies that the above stated patient is homebound and upon completion of the Face-To-Face encounter, has the need for intermittent skilled nursing, physical therapy and/or speech or occupational therapy services in their home for their current diagnosis as outlined in their initial plan of care. These services will continue to be monitored by myself or another physician. 19-Aug-2024 17:51

## 2024-08-19 NOTE — H&P ADULT - HISTORY OF PRESENT ILLNESS
42 y/o male with a PMHx of gastritis, perianal fistula presents to the ED c/o vomiting and abd pain since yesterday. Pt reports he ate around 11am yesterday and since then he has had abd pain and vomiting. Denies fevers. No other complaints at this time. Pt is a pleasant 44 y/o male with a PMHx of gastritis, perianal fistula, HTN on current dietary /lifestyle modification who presented to   [place]  ED c/o vomiting and abd pain since yesterday.   Pt reports he ate around 11am yesterday and since then he has had abd pain and vomiting.  Pt denies fevers.   He denies cpain/SOB,  no urinary or resp complaints,  no edema/calf pain.  Pt reports he had some symptoms of abd pain 3 weeks ago and was advised by his provider to have imaging.    He has a follow up with his PCP for high blood pressure in Sept 2024.

## 2024-08-19 NOTE — H&P ADULT - NSHPSOCIALHISTORY_GEN_ALL_CORE
Pt lives with his girlfriend.  No tob/ETOH/drug abuse.  He formerly worked in a store.  Pt exercises in a gym.

## 2024-08-19 NOTE — H&P ADULT - TIME-BASED BILLING (NON-CRITICAL CARE)
Alert and oriented, no focal deficits, no motor or sensory deficits. Time-based billing (NON-critical care)

## 2024-08-19 NOTE — CONSULT NOTE ADULT - SUBJECTIVE AND OBJECTIVE BOX
43 Y/M with PMHx of gastritis, perianal fistula presented to ED c/o upper abdominal pain since this AM. The pain started suddenly over epigastric region after he had lunch, gradually progressing in severity and associated w/ multiple episodes of nausea and vomiting, Denies fever, chills, chest pain, SOB, diarrhoea, weight loss, loss of appetite.    PAST MEDICAL & SURGICAL HISTORY:  Gastritis    Perianal fistula    MEDICATIONS  (STANDING):  folic acid 1 milliGRAM(s) Oral daily  multivitamin 1 Tablet(s) Oral daily  pantoprazole    Tablet 40 milliGRAM(s) Oral before breakfast  sodium chloride 0.9% with potassium chloride 20 mEq/L 2000 milliLiter(s) (250 mL/Hr) IV Continuous <Continuous>  thiamine 100 milliGRAM(s) Oral daily    MEDICATIONS  (PRN):  morphine  - Injectable 4 milliGRAM(s) IV Push every 4 hours PRN Severe Pain (7 - 10)  ondansetron Injectable 4 milliGRAM(s) IV Push every 6 hours PRN Nausea and/or Vomiting  Allergies    No Known Allergies  FAMILY HISTORY:  No pertinent family history in first degree relatives    Physical Exam:  General: Mild distress d/t pain  HEENT: NC/AT, EOMI, normal hearing, no oral lesions, no LAD, neck supple  Pulmonary: normal resp effort, CTA-B  Cardiovascular: NSR, no murmurs  Abdominal: soft, very tender epigastric region  Extremities: WWP, normal strength, no clubbing/cyanosis/edema  Neuro: A/O x 3, CNs II-XII grossly intact, normal sensation, no focal deficits  Pulses: palpable distal pulses    Vital Signs Last 24 Hrs  T(C): 36.5 (20 Aug 2024 00:14), Max: 36.5 (20 Aug 2024 00:14)  T(F): 97.7 (20 Aug 2024 00:14), Max: 97.7 (20 Aug 2024 00:14)  HR: 51 (20 Aug 2024 00:14) (49 - 51)  BP: 142/99 (20 Aug 2024 00:14) (135/86 - 142/99)  BP(mean): 113 (20 Aug 2024 00:14) (101 - 113)  RR: 18 (20 Aug 2024 00:14) (17 - 18)  SpO2: 100% (20 Aug 2024 00:14) (100% - 100%)    Parameters below as of 20 Aug 2024 00:14  Patient On (Oxygen Delivery Method): room air      LABS:                        17.0   13.57 )-----------( 142      ( 19 Aug 2024 16:49 )             49.9     08-20    139  |  109<H>  |  11  ----------------------------<  144<H>  3.9   |  26  |  1.16    Ca    8.9      20 Aug 2024 00:02    TPro  7.3  /  Alb  3.8  /  TBili  6.0<H>  /  DBili  x   /  AST  261<H>  /  ALT  539<H>  /  AlkPhos  176<H>  08-20    PT/INR - ( 19 Aug 2024 18:22 )   PT: 12.0 sec;   INR: 1.06 ratio         PTT - ( 19 Aug 2024 18:22 )  PTT:35.5 sec  Urinalysis Basic - ( 20 Aug 2024 00:02 )    Color: x / Appearance: x / SG: x / pH: x  Gluc: 144 mg/dL / Ketone: x  / Bili: x / Urobili: x   Blood: x / Protein: x / Nitrite: x   Leuk Esterase: x / RBC: x / WBC x   Sq Epi: x / Non Sq Epi: x / Bacteria: x      LIVER FUNCTIONS - ( 20 Aug 2024 00:02 )  Alb: 3.8 g/dL / Pro: 7.3 gm/dL / ALK PHOS: 176 U/L / ALT: 539 U/L / AST: 261 U/L / GGT: x             RADIOLOGY & ADDITIONAL STUDIES:  < from: CT Abdomen and Pelvis w/ IV Cont (08.19.24 @ 18:11) >  FINDINGS:  LOWER CHEST: Within normal limits.    LIVER: Within normal limits.  BILE DUCTS: Mild intrahepatic and extra hepatic biliary ductal   dilatation. CBD measures 10 mm in diameter.  GALLBLADDER: Distended gallbladder.  SPLEEN: Within normal limits.  PANCREAS: Tenderness pancreas. Moderate peripancreatic fluid. No   peripancreatic fluid collection. No pancreatic ductal dilatation.  ADRENALS: Within normal limits.  KIDNEYS/URETERS: Within normal limits.    BLADDER: Within normal limits.  REPRODUCTIVE ORGANS: Prostate within normal limits.    BOWEL: No bowel obstruction. Appendix is normal.  PERITONEUM/RETROPERITONEUM: Mild retroperitoneal fluid.  VESSELS: Within normal limits.  LYMPH NODES: No lymphadenopathy.  ABDOMINAL WALL: Within normal limits.  BONES: Within normal limits.    IMPRESSION:  Acute interstitial pancreatitis. No peripancreatic fluid collection.    Distended gallbladder. Mild intrahepatic and extrahepatic biliaryductal   dilatation. Consider further evaluation with MRCP to exclude   choledocholithiasis.        --- End of Report ---      < end of copied text >  < from: US Abdomen Upper Quadrant Right (08.19.24 @ 17:33) >    FINDINGS:  Liver: Within normal limits. There is hepatopedal flow in the main portal   vein  Bile ducts: Normal caliber. Common bile duct measures 10 mm.  Gallbladder: Cholelithiasis with multiple small stones but without   evidence ofacute cholecystitis.  Pancreas: Visualized portions are within normal limits.  Right kidney: 11.3 cm. No hydronephrosis.  Ascites: None.  IVC and aorta: Visualized portions are within normal limits.    IMPRESSION:  Cholelithiasis without evidence of acute cholecystitis    --- End of Report ---      < end of copied text >

## 2024-08-19 NOTE — ED STATDOCS - CLINICAL SUMMARY MEDICAL DECISION MAKING FREE TEXT BOX
43-year-old male no past medical history presents ED complaining of upper abdominal pain nausea and vomiting that started at 11 AM yesterday after eating.  Patient had subjective fever but denies chest pain, shortness of breath, dysuria, diarrhea, back pain, daily alcohol use, abdominal surgical history.  Patient on exam is hemodynamically stable, with epigastric abdominal tenderness.  Differential includes not limited to pancreatitis, gastritis, acute cholecystitis, doubt atypical ACS.  Plan for screening EKG, ultrasound, labs, pain control.  Reassess

## 2024-08-19 NOTE — ED STATDOCS - PROGRESS NOTE DETAILS
Brannon Wall DO (Attending): Patient's labs show concern for gallstone pancreatitis, my read of ultrasound patient with dilated CBD to 10 mm.  Patient ordered for antibiotics, additional IV fluids, blood cultures, CTAP ordered.  Patient require admission. Brannon Wall DO (Attending): Spoke to Dr. Wyman, recommends n.p.o. at midnight, clear liquid diet IV hydration for likely ERCP tomorrow. signed Terri Vanegas PA-C   I spoke to surgeon Dr Romo, will see pt as inpt consult tomorrow after GI consult. Admit pt to medicine for pancreatitis. Pt agrees with admission and plan of care.   Pt also incidentally bradycardic in ED rate 43-45 consistently. Case discussed with and admission accepted by hospitalist Dr. Renteria. Admit to tele.

## 2024-08-19 NOTE — H&P ADULT - NSHPLABSRESULTS_GEN_ALL_CORE
my interpretation    EKG Sinus bradycardia  43 bpm, q in III      < from: CT Abdomen and Pelvis w/ IV Cont (08.19.24 @ 18:11) >  IMPRESSION:  Acute interstitial pancreatitis. No peripancreatic fluid collection.    Distended gallbladder. Mild intrahepatic and extrahepatic biliaryductal   dilatation. Consider further evaluation with MRCP to exclude   choledocholithiasis.  --- End of Report ---  YAJAIRA HADDAD MD; Attending Radiologist  < end of copied text >        COMPARISON: CT scan 7/31/2021  TECHNIQUE: Sonography of the right upper quadrant.    FINDINGS:  Liver: Within normal limits. There is hepatopedal flow in the main portal   vein  Bile ducts: Normal caliber. Common bile duct measures 10 mm.  Gallbladder: Cholelithiasis with multiple small stones but without   evidence of acute cholecystitis.  Pancreas: Visualized portions are within normal limits.  Right kidney: 11.3 cm. No hydronephrosis.  Ascites: None.  IVC and aorta: Visualized portions are within normal limits.    IMPRESSION:  Cholelithiasis without evidence of acute cholecystitis    --- End of Report ---  TOÑO HAWKINS MD; Attending Radiologist  This document has been electronically signed. Aug 19 2024  6:04PM

## 2024-08-19 NOTE — ED ADULT TRIAGE NOTE - CHIEF COMPLAINT QUOTE
PT presents to er with complaints of multiple episodes of emesis after eating something yesterday, denies fevers, medical history.

## 2024-08-19 NOTE — PHARMACOTHERAPY INTERVENTION NOTE - COMMENTS
Medication reconciliation completed.  Reviewed Medication list and confirmed med allergies with patient; confirmed with Dr. First Medmadeline.

## 2024-08-19 NOTE — ED STATDOCS - CARE PLAN
1 Principal Discharge DX:	Pancreatitis  Secondary Diagnosis:	Gallstones  Secondary Diagnosis:	Elevated liver enzymes  Secondary Diagnosis:	Bradycardia

## 2024-08-19 NOTE — ED ADULT NURSE NOTE - OBJECTIVE STATEMENT
43y male presented to the ED with complaints of nausea, vomiting and abdominal pain since yesterday.

## 2024-08-19 NOTE — H&P ADULT - TIME BILLING
I spent a total of 75 minutes on the date of this encounter coordinating the patient's care. This includes reviewing documentation pertinent to this admission, results and imaging in addition to completing a history and physical examination on the patient. Further tests, medications, and procedures have been ordered as indicated. Laboratory results and the plan of care were communicated to the patient and or their family member. Supporting documentation was completed and added to the patient's chart.

## 2024-08-19 NOTE — ED STATDOCS - OBJECTIVE STATEMENT
42 y/o male with a PMHx of gastritis, perianal fistula presents to the ED c/o vomiting and abd pain since yesterday. Pt reports he ate around 11am yesterday and since then he has had abd pain and vomiting. Denies fevers. No other complaints at this time.  ID#: [id number].

## 2024-08-20 LAB
ALBUMIN SERPL ELPH-MCNC: 3.5 G/DL — SIGNIFICANT CHANGE UP (ref 3.3–5)
ALBUMIN SERPL ELPH-MCNC: 3.8 G/DL — SIGNIFICANT CHANGE UP (ref 3.3–5)
ALP SERPL-CCNC: 151 U/L — HIGH (ref 40–120)
ALP SERPL-CCNC: 176 U/L — HIGH (ref 40–120)
ALT FLD-CCNC: 429 U/L — HIGH (ref 12–78)
ALT FLD-CCNC: 539 U/L — HIGH (ref 12–78)
ANION GAP SERPL CALC-SCNC: 4 MMOL/L — LOW (ref 5–17)
ANION GAP SERPL CALC-SCNC: 6 MMOL/L — SIGNIFICANT CHANGE UP (ref 5–17)
AST SERPL-CCNC: 157 U/L — HIGH (ref 15–37)
AST SERPL-CCNC: 261 U/L — HIGH (ref 15–37)
BASOPHILS # BLD AUTO: 0.02 K/UL — SIGNIFICANT CHANGE UP (ref 0–0.2)
BASOPHILS NFR BLD AUTO: 0.1 % — SIGNIFICANT CHANGE UP (ref 0–2)
BILIRUB DIRECT SERPL-MCNC: 0.8 MG/DL — HIGH (ref 0–0.3)
BILIRUB SERPL-MCNC: 3.2 MG/DL — HIGH (ref 0.2–1.2)
BILIRUB SERPL-MCNC: 6 MG/DL — HIGH (ref 0.2–1.2)
BUN SERPL-MCNC: 11 MG/DL — SIGNIFICANT CHANGE UP (ref 7–23)
BUN SERPL-MCNC: 11 MG/DL — SIGNIFICANT CHANGE UP (ref 7–23)
CALCIUM SERPL-MCNC: 8.3 MG/DL — LOW (ref 8.5–10.1)
CALCIUM SERPL-MCNC: 8.9 MG/DL — SIGNIFICANT CHANGE UP (ref 8.5–10.1)
CHLORIDE SERPL-SCNC: 109 MMOL/L — HIGH (ref 96–108)
CHLORIDE SERPL-SCNC: 110 MMOL/L — HIGH (ref 96–108)
CO2 SERPL-SCNC: 26 MMOL/L — SIGNIFICANT CHANGE UP (ref 22–31)
CO2 SERPL-SCNC: 26 MMOL/L — SIGNIFICANT CHANGE UP (ref 22–31)
CREAT SERPL-MCNC: 1.04 MG/DL — SIGNIFICANT CHANGE UP (ref 0.5–1.3)
CREAT SERPL-MCNC: 1.16 MG/DL — SIGNIFICANT CHANGE UP (ref 0.5–1.3)
CRP SERPL-MCNC: 8 MG/L — HIGH
EGFR: 80 ML/MIN/1.73M2 — SIGNIFICANT CHANGE UP
EGFR: 91 ML/MIN/1.73M2 — SIGNIFICANT CHANGE UP
EOSINOPHIL # BLD AUTO: 0 K/UL — SIGNIFICANT CHANGE UP (ref 0–0.5)
EOSINOPHIL NFR BLD AUTO: 0 % — SIGNIFICANT CHANGE UP (ref 0–6)
GLUCOSE SERPL-MCNC: 119 MG/DL — HIGH (ref 70–99)
GLUCOSE SERPL-MCNC: 144 MG/DL — HIGH (ref 70–99)
HCT VFR BLD CALC: 47.3 % — SIGNIFICANT CHANGE UP (ref 39–50)
HGB BLD-MCNC: 16.1 G/DL — SIGNIFICANT CHANGE UP (ref 13–17)
IMM GRANULOCYTES NFR BLD AUTO: 0.4 % — SIGNIFICANT CHANGE UP (ref 0–0.9)
LYMPHOCYTES # BLD AUTO: 0.72 K/UL — LOW (ref 1–3.3)
LYMPHOCYTES # BLD AUTO: 5 % — LOW (ref 13–44)
MCHC RBC-ENTMCNC: 28.4 PG — SIGNIFICANT CHANGE UP (ref 27–34)
MCHC RBC-ENTMCNC: 34 GM/DL — SIGNIFICANT CHANGE UP (ref 32–36)
MCV RBC AUTO: 83.6 FL — SIGNIFICANT CHANGE UP (ref 80–100)
MONOCYTES # BLD AUTO: 0.88 K/UL — SIGNIFICANT CHANGE UP (ref 0–0.9)
MONOCYTES NFR BLD AUTO: 6.1 % — SIGNIFICANT CHANGE UP (ref 2–14)
NEUTROPHILS # BLD AUTO: 12.78 K/UL — HIGH (ref 1.8–7.4)
NEUTROPHILS NFR BLD AUTO: 88.4 % — HIGH (ref 43–77)
PLATELET # BLD AUTO: 122 K/UL — LOW (ref 150–400)
POTASSIUM SERPL-MCNC: 3.9 MMOL/L — SIGNIFICANT CHANGE UP (ref 3.5–5.3)
POTASSIUM SERPL-MCNC: 4.2 MMOL/L — SIGNIFICANT CHANGE UP (ref 3.5–5.3)
POTASSIUM SERPL-SCNC: 3.9 MMOL/L — SIGNIFICANT CHANGE UP (ref 3.5–5.3)
POTASSIUM SERPL-SCNC: 4.2 MMOL/L — SIGNIFICANT CHANGE UP (ref 3.5–5.3)
PROT SERPL-MCNC: 6.5 GM/DL — SIGNIFICANT CHANGE UP (ref 6–8.3)
PROT SERPL-MCNC: 7.3 GM/DL — SIGNIFICANT CHANGE UP (ref 6–8.3)
RBC # BLD: 5.66 M/UL — SIGNIFICANT CHANGE UP (ref 4.2–5.8)
RBC # FLD: 13.3 % — SIGNIFICANT CHANGE UP (ref 10.3–14.5)
SODIUM SERPL-SCNC: 139 MMOL/L — SIGNIFICANT CHANGE UP (ref 135–145)
SODIUM SERPL-SCNC: 142 MMOL/L — SIGNIFICANT CHANGE UP (ref 135–145)
WBC # BLD: 14.46 K/UL — HIGH (ref 3.8–10.5)
WBC # FLD AUTO: 14.46 K/UL — HIGH (ref 3.8–10.5)

## 2024-08-20 PROCEDURE — 99254 IP/OBS CNSLTJ NEW/EST MOD 60: CPT | Mod: 25

## 2024-08-20 PROCEDURE — 99233 SBSQ HOSP IP/OBS HIGH 50: CPT

## 2024-08-20 PROCEDURE — 43264 ERCP REMOVE DUCT CALCULI: CPT

## 2024-08-20 PROCEDURE — 43259 EGD US EXAM DUODENUM/JEJUNUM: CPT

## 2024-08-20 PROCEDURE — 43274 ERCP DUCT STENT PLACEMENT: CPT

## 2024-08-20 RX ORDER — PIPERACILLIN SODIUM AND TAZOBACTAM SODIUM 3; .375 G/15ML; G/15ML
3.38 INJECTION, POWDER, FOR SOLUTION INTRAVENOUS EVERY 8 HOURS
Refills: 0 | Status: COMPLETED | OUTPATIENT
Start: 2024-08-20 | End: 2024-08-26

## 2024-08-20 RX ORDER — FENTANYL CITRATE 50 UG/ML
50 INJECTION INTRAMUSCULAR; INTRAVENOUS
Refills: 0 | Status: DISCONTINUED | OUTPATIENT
Start: 2024-08-20 | End: 2024-08-20

## 2024-08-20 RX ORDER — SODIUM CHLORIDE 9 MG/ML
2000 INJECTION INTRAMUSCULAR; INTRAVENOUS; SUBCUTANEOUS
Refills: 0 | Status: DISCONTINUED | OUTPATIENT
Start: 2024-08-20 | End: 2024-08-27

## 2024-08-20 RX ORDER — AMLODIPINE BESYLATE 10 MG/1
5 TABLET ORAL DAILY
Refills: 0 | Status: DISCONTINUED | OUTPATIENT
Start: 2024-08-20 | End: 2024-08-21

## 2024-08-20 RX ORDER — OXYCODONE HYDROCHLORIDE 5 MG/1
5 TABLET ORAL ONCE
Refills: 0 | Status: DISCONTINUED | OUTPATIENT
Start: 2024-08-20 | End: 2024-08-20

## 2024-08-20 RX ADMIN — PIPERACILLIN SODIUM AND TAZOBACTAM SODIUM 25 GRAM(S): 3; .375 INJECTION, POWDER, FOR SOLUTION INTRAVENOUS at 16:18

## 2024-08-20 RX ADMIN — Medication 4 MILLIGRAM(S): at 04:54

## 2024-08-20 RX ADMIN — Medication 4 MILLIGRAM(S): at 00:13

## 2024-08-20 RX ADMIN — Medication 1000 MILLILITER(S): at 15:03

## 2024-08-20 RX ADMIN — Medication 1 TABLET(S): at 09:38

## 2024-08-20 RX ADMIN — ONDANSETRON 4 MILLIGRAM(S): 2 INJECTION, SOLUTION INTRAMUSCULAR; INTRAVENOUS at 00:13

## 2024-08-20 RX ADMIN — Medication 100 MILLIGRAM(S): at 09:38

## 2024-08-20 RX ADMIN — PIPERACILLIN SODIUM AND TAZOBACTAM SODIUM 25 GRAM(S): 3; .375 INJECTION, POWDER, FOR SOLUTION INTRAVENOUS at 06:08

## 2024-08-20 RX ADMIN — Medication 4 MILLIGRAM(S): at 09:38

## 2024-08-20 RX ADMIN — DEXTROSE, SODIUM ACETATE, POTASSIUM CHLORIDE, POTASSIUM PHOSPHATE, AND SODIUM CHLORIDE 250 MILLILITER(S): 5; .15; .13; .28; .091 INJECTION, SOLUTION INTRAVENOUS at 00:47

## 2024-08-20 RX ADMIN — Medication 4 MILLIGRAM(S): at 08:22

## 2024-08-20 RX ADMIN — SODIUM CHLORIDE 175 MILLILITER(S): 9 INJECTION INTRAMUSCULAR; INTRAVENOUS; SUBCUTANEOUS at 06:08

## 2024-08-20 RX ADMIN — Medication 1 MILLIGRAM(S): at 09:38

## 2024-08-20 RX ADMIN — AMLODIPINE BESYLATE 5 MILLIGRAM(S): 10 TABLET ORAL at 09:38

## 2024-08-20 RX ADMIN — Medication 4 MILLIGRAM(S): at 04:24

## 2024-08-20 RX ADMIN — Medication 1000 MILLILITER(S): at 14:00

## 2024-08-20 RX ADMIN — PIPERACILLIN SODIUM AND TAZOBACTAM SODIUM 25 GRAM(S): 3; .375 INJECTION, POWDER, FOR SOLUTION INTRAVENOUS at 20:53

## 2024-08-20 NOTE — PATIENT PROFILE ADULT - FALL HARM RISK - RISK INTERVENTIONS
Assistance OOB with selected safe patient handling equipment/Assistance with ambulation/Communicate Fall Risk and Risk Factors to all staff, patient, and family/Discuss with provider need for PT consult/Monitor for mental status changes/Monitor gait and stability/Provide patient with walking aids - walker, cane, crutches/Reinforce activity limits and safety measures with patient and family/Reorient to person, place and time as needed/Review medications for side effects contributing to fall risk/Sit up slowly, dangle for a short time, stand at bedside before walking/Toileting schedule using arm’s reach rule for commode and bathroom/Use of alarms - bed, chair and/or voice tab/Visual Cue: Yellow wristband/Bed in lowest position, wheels locked, appropriate side rails in place/Call bell, personal items and telephone in reach/Instruct patient to call for assistance before getting out of bed or chair/Non-slip footwear when patient is out of bed/[place] to call system/Physically safe environment - no spills, clutter or unnecessary equipment/Purposeful Proactive Rounding/Room/bathroom lighting operational, light cord in reach

## 2024-08-20 NOTE — PROGRESS NOTE ADULT - SUBJECTIVE AND OBJECTIVE BOX
HOSPITALIST ATTENDING PROGRESS NOTE    Chart and meds reviewed.      Subjective: Patient seen and examined. Resting comfortably, Upper Abdominal pain tender to palpation. Plan for ERCP today. Continue NPO and pain control.       Additional results/Imaging, I have personally reviewed:    LABS:                            16.1   14.46 )-----------( 122      ( 20 Aug 2024 07:23 )             47.3     08-20    142  |  110<H>  |  11  ----------------------------<  119<H>  4.2   |  26  |  1.04    Ca    8.3<L>      20 Aug 2024 07:23    TPro  6.5  /  Alb  3.5  /  TBili  3.2<H>  /  DBili  0.8<H>  /  AST  157<H>  /  ALT  429<H>  /  AlkPhos  151<H>  08-20        LIVER FUNCTIONS - ( 20 Aug 2024 07:23 )  Alb: 3.5 g/dL / Pro: 6.5 gm/dL / ALK PHOS: 151 U/L / ALT: 429 U/L / AST: 157 U/L / GGT: x           PT/INR - ( 19 Aug 2024 18:22 )   PT: 12.0 sec;   INR: 1.06 ratio         PTT - ( 19 Aug 2024 18:22 )  PTT:35.5 sec  Urinalysis Basic - ( 20 Aug 2024 07:23 )    Color: x / Appearance: x / SG: x / pH: x  Gluc: 119 mg/dL / Ketone: x  / Bili: x / Urobili: x   Blood: x / Protein: x / Nitrite: x   Leuk Esterase: x / RBC: x / WBC x   Sq Epi: x / Non Sq Epi: x / Bacteria: x        Lactate, Blood: 1.6 mmol/L (08-19 @ 18:22)        All other systems reviewed and found to be negative with the exception of what has been described above.    MEDICATIONS  (STANDING):  amLODIPine   Tablet 5 milliGRAM(s) Oral daily  folic acid 1 milliGRAM(s) Oral daily  lactated ringers. 1000 milliLiter(s) (100 mL/Hr) IV Continuous <Continuous>  multivitamin 1 Tablet(s) Oral daily  pantoprazole    Tablet 40 milliGRAM(s) Oral before breakfast  piperacillin/tazobactam IVPB.. 3.375 Gram(s) IV Intermittent every 8 hours  sodium chloride 0.9%. 2000 milliLiter(s) (175 mL/Hr) IV Continuous <Continuous>  thiamine 100 milliGRAM(s) Oral daily    MEDICATIONS  (PRN):  fentaNYL    Injectable 50 MICROGram(s) IV Push every 10 minutes PRN Severe Pain (7 - 10)  morphine  - Injectable 4 milliGRAM(s) IV Push every 4 hours PRN Severe Pain (7 - 10)  ondansetron Injectable 4 milliGRAM(s) IV Push every 6 hours PRN Nausea and/or Vomiting  oxyCODONE    IR 5 milliGRAM(s) Oral once PRN Moderate Pain (4 - 6)      VITALS:  T(F): 97.9 (08-20-24 @ 15:00), Max: 98.4 (08-20-24 @ 03:34)  HR: 48 (08-20-24 @ 15:00) (46 - 65)  BP: 135/86 (08-20-24 @ 15:00) (135/86 - 174/82)  RR: 17 (08-20-24 @ 15:00) (14 - 18)  SpO2: 98% (08-20-24 @ 15:00) (98% - 100%)  Wt(kg): --    I&O's Summary    20 Aug 2024 07:01  -  20 Aug 2024 15:52  --------------------------------------------------------  IN: 500 mL / OUT: 0 mL / NET: 500 mL        CAPILLARY BLOOD GLUCOSE          PHYSICAL EXAM:  Gen: No acute distress   HEENT:  pupils equal and reactive, EOMI,   NECK:   supple, no carotid bruits, No JVD  CV:  +S1, +S2, regular rate rhythm, no murmurs or rubs  RESP:   lungs clear to auscultation bilaterally, no wheezing, rales, rhonchi, good air entry bilaterally  GI:  abdomen soft, tender, non-distended, normal BS  MSK:   normal muscle tone, no atrophy, no rigidity, no contractions  EXT:  no clubbing, no cyanosis, no edema, no calf pain, swelling or erythema  VASCULAR:  pulses equal and symmetric in the upper and lower extremities  NEURO:  AAOX3, no focal neurological deficits, follows all commands, able to move extremities spontaneously  SKIN:  no ulcers, lesions or rashes      CULTURES:      Telemetry, personally reviewed

## 2024-08-20 NOTE — CONSULT NOTE ADULT - NS ATTEND AMEND GEN_ALL_CORE FT
43 year old man with biliary pancreatitis and juandice.     Aggressive LR for pancreatitis, at least 250cc/hr the first 24 hours.   EUS+/- ERCP.   Timing of cholecystectomy per surgery.

## 2024-08-20 NOTE — CONSULT NOTE ADULT - SUBJECTIVE AND OBJECTIVE BOX
Patient is a 43y old  Male who presents with a chief complaint of abdominal pain  pancreatitis (19 Aug 2024 23:44)      HPI:  Pt is a pleasant 44 y/o male with a PMHx of gastritis, perianal fistula, HTN on current dietary /lifestyle modification who presented to   [place]  [facility] c/o vomiting and abd pain since yesterday.   Pt reports he ate around 11am yesterday and since then he has had abd pain and vomiting.  Pt denies fevers.   He denies cpain/SOB,  no urinary or resp complaints,  no edema/calf pain.  Pt reports he had some symptoms of abd pain 3 weeks ago and was advised by his provider to have imaging.    He has a follow up with his PCP for high blood pressure in Sept 2024. (19 Aug 2024 23:44)      PAST MEDICAL & SURGICAL HISTORY:  Gastritis      Perianal fistula      No significant past surgical history          MEDICATIONS  (STANDING):  amLODIPine   Tablet 5 milliGRAM(s) Oral daily  folic acid 1 milliGRAM(s) Oral daily  multivitamin 1 Tablet(s) Oral daily  pantoprazole    Tablet 40 milliGRAM(s) Oral before breakfast  piperacillin/tazobactam IVPB.. 3.375 Gram(s) IV Intermittent every 8 hours  sodium chloride 0.9%. 2000 milliLiter(s) (175 mL/Hr) IV Continuous <Continuous>  thiamine 100 milliGRAM(s) Oral daily    MEDICATIONS  (PRN):  morphine  - Injectable 4 milliGRAM(s) IV Push every 4 hours PRN Severe Pain (7 - 10)  ondansetron Injectable 4 milliGRAM(s) IV Push every 6 hours PRN Nausea and/or Vomiting      Allergies    No Known Allergies    Intolerances        SOCIAL HISTORY:    FAMILY HISTORY:  No pertinent family history in first degree relatives        REVIEW OF SYSTEMS:    CONSTITUTIONAL: No weakness, fevers or chills  EYES/ENT: No visual changes;  No vertigo or throat pain   NECK: No pain or stiffness  RESPIRATORY: No cough, wheezing, hemoptysis; No shortness of breath  CARDIOVASCULAR: No chest pain or palpitations  GASTROINTESTINAL: No abdominal or epigastric pain. No nausea, vomiting, or hematemesis; No diarrhea or constipation. No melena or hematochezia.  GENITOURINARY: No dysuria, frequency or hematuria  NEUROLOGICAL: No numbness or weakness  SKIN: No itching, burning, rashes, or lesions   PSYCH: Normal mood and affect  All other review of systems is negative unless indicated above.    Vital Signs Last 24 Hrs  T(C): 36.4 (20 Aug 2024 08:06), Max: 36.9 (20 Aug 2024 03:34)  T(F): 97.6 (20 Aug 2024 08:06), Max: 98.4 (20 Aug 2024 03:34)  HR: 65 (20 Aug 2024 08:06) (49 - 65)  BP: 165/89 (20 Aug 2024 08:06) (135/86 - 174/82)  BP(mean): 108 (20 Aug 2024 02:47) (101 - 113)  RR: 18 (20 Aug 2024 08:06) (16 - 18)  SpO2: 100% (20 Aug 2024 08:06) (100% - 100%)    Parameters below as of 20 Aug 2024 08:06  Patient On (Oxygen Delivery Method): room air        PHYSICAL EXAM:    Constitutional: No acute distress, well-developed, non-toxic appearing  HEENT: masked, good phonation, not icteric  Neck: supple, no lymphadenopathy  Respiratory: clear to ascultation bilaterally, no wheezing  Cardiovascular: S1 and S2, regular rate and rhythm, no murmurs rubs or gallops  Gastrointestinal: soft, non-tender, non-distended, +bowel sounds, no rebound or guarding, no surgical scars, no drains  Extremities: No peripheral edema, no cyanosis or clubbing  Vascular: 2+ peripheral pulses, no venous stasis  Neurological: A/O x 3, no focal deficits, no asterixis  Psychiatric: Normal mood, normal affect  Skin: No rashes, not jaundiced    LABS:                        16.1   14.46 )-----------( 122      ( 20 Aug 2024 07:23 )             47.3     08-20    142  |  110<H>  |  11  ----------------------------<  119<H>  4.2   |  26  |  1.04    Ca    8.3<L>      20 Aug 2024 07:23    TPro  6.5  /  Alb  3.5  /  TBili  3.2<H>  /  DBili  0.8<H>  /  AST  157<H>  /  ALT  429<H>  /  AlkPhos  151<H>  08-20    PT/INR - ( 19 Aug 2024 18:22 )   PT: 12.0 sec;   INR: 1.06 ratio         PTT - ( 19 Aug 2024 18:22 )  PTT:35.5 sec  LIVER FUNCTIONS - ( 20 Aug 2024 07:23 )  Alb: 3.5 g/dL / Pro: 6.5 gm/dL / ALK PHOS: 151 U/L / ALT: 429 U/L / AST: 157 U/L / GGT: x             RADIOLOGY & ADDITIONAL STUDIES:  IMPRESSION:  Acute interstitial pancreatitis. No peripancreatic fluid collection.    Distended gallbladder. Mild intrahepatic and extrahepatic biliary ductal   dilatation. Consider further evaluation with MRCP to exclude   choledocholithiasis. Patient is a 43y old  Male who presents with a chief complaint of abdominal pain    HPI:  This is a pleasant 43 year old male with significant past medical history of gastritis, perianal fistula, HTN, not on medication presenting to [facility] with vomiting and diffuse abdominal pain. Initial CT imaging with biliary dilatation, pancreatitid, and distended gallbladder. Advanced GI service consulted to rule out choledocholithiasis. Evaluated at bedside this morning with Dr. Price as intepreter bedside. Patient endorses diffused abdominal pain for two days with nausea and vomiting of food with minimal alleviation from medication and aggravation from eating. Localized pain to primarily upper quadrants originating in LUQ radiating across to right flank. Denies fever, chills, chest pain, or shortness of breath. Denies similar episodes in past. Had formed non bloody bowel movement yesterday. Denies any overt signs of GIB including hematochezia, hematemesis, or melena. Currently endorses mild nausea. Initial tbili 6.0 now 3.2 with transaminases with subtle downtrending as well. Leukocytosis. hemodynamically stable, afrebrile.    PAST MEDICAL & SURGICAL HISTORY:    Gastritis    Perianal fistula    No significant past surgical history    MEDICATIONS  (STANDING):  amLODIPine   Tablet 5 milliGRAM(s) Oral daily  folic acid 1 milliGRAM(s) Oral daily  multivitamin 1 Tablet(s) Oral daily  pantoprazole    Tablet 40 milliGRAM(s) Oral before breakfast  piperacillin/tazobactam IVPB.. 3.375 Gram(s) IV Intermittent every 8 hours  sodium chloride 0.9%. 2000 milliLiter(s) (175 mL/Hr) IV Continuous <Continuous>  thiamine 100 milliGRAM(s) Oral daily    MEDICATIONS  (PRN):  morphine  - Injectable 4 milliGRAM(s) IV Push every 4 hours PRN Severe Pain (7 - 10)  ondansetron Injectable 4 milliGRAM(s) IV Push every 6 hours PRN Nausea and/or Vomiting      Allergies    No Known Allergies    Intolerances    SOCIAL HISTORY:    FAMILY HISTORY:  No pertinent family history in first degree relatives    REVIEW OF SYSTEMS:    CONSTITUTIONAL: No weakness, fevers or chills  EYES/ENT: No visual changes;  No vertigo or throat pain   NECK: No pain or stiffness  RESPIRATORY: No cough, wheezing, hemoptysis; No shortness of breath  CARDIOVASCULAR: No chest pain or palpitations  GASTROINTESTINAL: See HPI  GENITOURINARY: No dysuria, frequency or hematuria  NEUROLOGICAL: No numbness or weakness  SKIN: No itching, burning, rashes, or lesions   PSYCH: Normal mood and affect  All other review of systems is negative unless indicated above.    Vital Signs Last 24 Hrs  T(C): 36.4 (20 Aug 2024 08:06), Max: 36.9 (20 Aug 2024 03:34)  T(F): 97.6 (20 Aug 2024 08:06), Max: 98.4 (20 Aug 2024 03:34)  HR: 65 (20 Aug 2024 08:06) (49 - 65)  BP: 165/89 (20 Aug 2024 08:06) (135/86 - 174/82)  BP(mean): 108 (20 Aug 2024 02:47) (101 - 113)  RR: 18 (20 Aug 2024 08:06) (16 - 18)  SpO2: 100% (20 Aug 2024 08:06) (100% - 100%)    Parameters below as of 20 Aug 2024 08:06  Patient On (Oxygen Delivery Method): room air    PHYSICAL EXAM:    Constitutional: No acute distress, well-developed, non-toxic appearing  HEENT: masked, good phonation, icteric  Neck: supple, no lymphadenopathy  Respiratory: clear to ascultation bilaterally, no wheezing  Cardiovascular: S1 and S2, regular rate and rhythm, no murmurs rubs or gallops  Gastrointestinal: soft, tender to light palpation b/l UQ, non-distended, +bowel sounds, no rebound or guarding, no surgical scars, no drains  Extremities: No peripheral edema, no cyanosis or clubbing  Vascular: 2+ peripheral pulses, no venous stasis  Neurological: A/O x 3, no focal deficits, no asterixis  Psychiatric: Normal mood, normal affect  Skin: No rashes, jaundiced    LABS:                        16.1   14.46 )-----------( 122      ( 20 Aug 2024 07:23 )             47.3     08-20    142  |  110<H>  |  11  ----------------------------<  119<H>  4.2   |  26  |  1.04    Ca    8.3<L>      20 Aug 2024 07:23    TPro  6.5  /  Alb  3.5  /  TBili  3.2<H>  /  DBili  0.8<H>  /  AST  157<H>  /  ALT  429<H>  /  AlkPhos  151<H>  08-20    PT/INR - ( 19 Aug 2024 18:22 )   PT: 12.0 sec;   INR: 1.06 ratio         PTT - ( 19 Aug 2024 18:22 )  PTT:35.5 sec  LIVER FUNCTIONS - ( 20 Aug 2024 07:23 )  Alb: 3.5 g/dL / Pro: 6.5 gm/dL / ALK PHOS: 151 U/L / ALT: 429 U/L / AST: 157 U/L / GGT: x             RADIOLOGY & ADDITIONAL STUDIES:  IMPRESSION:  Acute interstitial pancreatitis. No peripancreatic fluid collection.    Distended gallbladder. Mild intrahepatic and extrahepatic biliary ductal   dilatation. Consider further evaluation with MRCP to exclude   choledocholithiasis. Patient is a 43y old  Male who presents with a chief complaint of abdominal pain    43 year old male with significant past medical history of gastritis, perianal fistula, HTN, not on medication presenting to [facility] with vomiting and diffuse abdominal pain. Initial CT imaging with biliary dilatation, pancreatitis, and distended gallbladder. Advanced GI service consulted to rule out choledocholithiasis.     Evaluated at bedside this morning. Patient endorses diffused abdominal pain for two days with nausea and vomiting of food with minimal alleviation from medication and aggravation from eating. Localized pain to primarily upper quadrants originating in LUQ radiating across to right flank. Pain was sudden onset, 8/10, constant, diffuse on the abdomen, non radiating. Denies fever, chills, chest pain, or shortness of breath. Denies similar episodes in past. Had formed non bloody bowel movement yesterday. Denies any overt signs of GIB including hematochezia, hematemesis, or melena. Currently endorses mild nausea. No eating raw or undercooked foods. No diarrhea.  Initial tbili 6.0 now 3.2 with transaminases with subtle downtrending as well. Leukocytosis. hemodynamically stable, afrebrile.    PAST MEDICAL & SURGICAL HISTORY:    Gastritis    Perianal fistula    HTN    MEDICATIONS  (STANDING):  amLODIPine   Tablet 5 milliGRAM(s) Oral daily  folic acid 1 milliGRAM(s) Oral daily  multivitamin 1 Tablet(s) Oral daily  pantoprazole    Tablet 40 milliGRAM(s) Oral before breakfast  piperacillin/tazobactam IVPB.. 3.375 Gram(s) IV Intermittent every 8 hours  sodium chloride 0.9%. 2000 milliLiter(s) (175 mL/Hr) IV Continuous <Continuous>  thiamine 100 milliGRAM(s) Oral daily    MEDICATIONS  (PRN):  morphine  - Injectable 4 milliGRAM(s) IV Push every 4 hours PRN Severe Pain (7 - 10)  ondansetron Injectable 4 milliGRAM(s) IV Push every 6 hours PRN Nausea and/or Vomiting      Allergies    No Known Allergies    Intolerances    SOCIAL HISTORY:  no smoking, drinking or drugs    FAMILY HISTORY:  No pertinent family history in first degree relatives  no colon or stomach cancer    REVIEW OF SYSTEMS:    CONSTITUTIONAL: No weakness, fevers or chills  EYES/ENT: No visual changes;  No vertigo or throat pain   NECK: No pain or stiffness  RESPIRATORY: No cough, wheezing, hemoptysis; No shortness of breath  CARDIOVASCULAR: No chest pain or palpitations  GASTROINTESTINAL: See HPI  GENITOURINARY: No dysuria, frequency or hematuria  NEUROLOGICAL: No numbness or weakness  SKIN: No itching, burning, rashes, or lesions   PSYCH: Normal mood and affect  All other review of systems is negative unless indicated above.    Vital Signs Last 24 Hrs  T(C): 36.4 (20 Aug 2024 08:06), Max: 36.9 (20 Aug 2024 03:34)  T(F): 97.6 (20 Aug 2024 08:06), Max: 98.4 (20 Aug 2024 03:34)  HR: 65 (20 Aug 2024 08:06) (49 - 65)  BP: 165/89 (20 Aug 2024 08:06) (135/86 - 174/82)  BP(mean): 108 (20 Aug 2024 02:47) (101 - 113)  RR: 18 (20 Aug 2024 08:06) (16 - 18)  SpO2: 100% (20 Aug 2024 08:06) (100% - 100%)    Parameters below as of 20 Aug 2024 08:06  Patient On (Oxygen Delivery Method): room air    PHYSICAL EXAM:    Constitutional: No acute distress, well-developed, non-toxic appearing  HEENT: masked, good phonation, +icteric  Neck: supple, no lymphadenopathy  Respiratory: clear to ascultation bilaterally, no wheezing  Cardiovascular: S1 and S2, regular rate and rhythm, no murmurs rubs or gallops  Gastrointestinal: soft, tender to light palpation b/l UQ, non-distended, +bowel sounds, no rebound or guarding, no surgical scars, no drains  Extremities: No peripheral edema, no cyanosis or clubbing  Vascular: 2+ peripheral pulses, no venous stasis  Neurological: A/O x 3, no focal deficits, no asterixis  Psychiatric: Normal mood, normal affect  Skin: No rashes,+ jaundiced    LABS:                        16.1   14.46 )-----------( 122      ( 20 Aug 2024 07:23 )             47.3     08-20    142  |  110<H>  |  11  ----------------------------<  119<H>  4.2   |  26  |  1.04    Ca    8.3<L>      20 Aug 2024 07:23    TPro  6.5  /  Alb  3.5  /  TBili  3.2<H>  /  DBili  0.8<H>  /  AST  157<H>  /  ALT  429<H>  /  AlkPhos  151<H>  08-20    PT/INR - ( 19 Aug 2024 18:22 )   PT: 12.0 sec;   INR: 1.06 ratio         PTT - ( 19 Aug 2024 18:22 )  PTT:35.5 sec  LIVER FUNCTIONS - ( 20 Aug 2024 07:23 )  Alb: 3.5 g/dL / Pro: 6.5 gm/dL / ALK PHOS: 151 U/L / ALT: 429 U/L / AST: 157 U/L / GGT: x             RADIOLOGY & ADDITIONAL STUDIES:  IMPRESSION:  Acute interstitial pancreatitis. No peripancreatic fluid collection.    Distended gallbladder. Mild intrahepatic and extrahepatic biliary ductal   dilatation. Consider further evaluation with MRCP to exclude   choledocholithiasis.

## 2024-08-20 NOTE — CHART NOTE - NSCHARTNOTEFT_GEN_A_CORE
Pt seen and examined at bedside this am.  Pt c/o pain in all 4 quadrants of abdomen.  GI team (Dr. Wyman) to see patient today.  Possible ERCP today.    Vital Signs Last 24 Hrs  T(C): 36.4 (20 Aug 2024 08:06), Max: 36.9 (20 Aug 2024 03:34)  T(F): 97.6 (20 Aug 2024 08:06), Max: 98.4 (20 Aug 2024 03:34)  HR: 65 (20 Aug 2024 08:06) (49 - 65)  BP: 165/89 (20 Aug 2024 08:06) (135/86 - 174/82)  BP(mean): 108 (20 Aug 2024 02:47) (101 - 113)  RR: 18 (20 Aug 2024 08:06) (16 - 18)  SpO2: 100% (20 Aug 2024 08:06) (100% - 100%)    Parameters below as of 20 Aug 2024 08:06  Patient On (Oxygen Delivery Method): room air                        16.1   14.46 )-----------( 122      ( 20 Aug 2024 07:23 )             47.3     PT/INR - ( 19 Aug 2024 18:22 )   PT: 12.0 sec;   INR: 1.06 ratio         PTT - ( 19 Aug 2024 18:22 )  PTT:35.5 sec  08-20    142  |  110<H>  |  11  ----------------------------<  119<H>  4.2   |  26  |  1.04    Ca    8.3<L>      20 Aug 2024 07:23    TPro  6.5  /  Alb  3.5  /  TBili  3.2<H>  /  DBili  0.8<H>  /  AST  157<H>  /  ALT  429<H>  /  AlkPhos  151<H>  08-20    Type and Screen Antibody Screen: NEG (08-19 @ 18:22)    PHYSICAL EXAM  CV-RRR, S1, S2  Lungs- CTA b/L, no wheezes  ABD- non-distended, +BS, +tenderness to palpation RUQ/RLQ,/ LUQ/ LLQ and umbilical, no guarding, no peritoneal signs    ASSESSMENT-  54 yo male with biliary pancreatitis, elevated LFTs and t.bili 3.2    PLAN-  NPO  IVF  f/u GI (P[facility]as c/s) for ERCP  LAP MICHELLE planned for after ERCP    d/w Dr. Jeffrey

## 2024-08-20 NOTE — PATIENT PROFILE ADULT - NSPROGENOTHERPROVIDER_GEN_A_NUR
Subjective:      Patient ID: Debbie Vo is a 62 y.o. female.    Chief Complaint: Pain of the Right Knee      62-year-old female follow-up right knee bone-on-bone osteoarthritis.  Is now having left knee pain also.  Pain is laterally and medially.  Worse with ambulation.  She denies mechanical symptoms.  She recently had a right knee Zilretta injection which helped about 3 months.  She is had minimal relief from previous Toradol and corticosteroid injections.  She is interested in a total knee replacement but in the future.        Review of Systems   Constitutional: Negative for chills and fever.   Cardiovascular:  Negative for chest pain.   Respiratory:  Negative for cough.    Hematologic/Lymphatic: Does not bruise/bleed easily.   Skin:  Negative for poor wound healing and rash.   Musculoskeletal:  Positive for arthritis, joint pain, myalgias and stiffness.   Gastrointestinal:  Negative for abdominal pain.   Genitourinary:  Negative for bladder incontinence.   Neurological:  Negative for dizziness, loss of balance and weakness.   Psychiatric/Behavioral:  Negative for altered mental status.        Review of patient's allergies indicates:  No Known Allergies     Current Outpatient Medications   Medication Sig Dispense Refill    albuterol (VENTOLIN HFA) 90 mcg/actuation inhaler Inhale 2 puffs into the lungs every 6 (six) hours as needed for Wheezing. Rescue 18 g 3    allopurinoL (ZYLOPRIM) 300 MG tablet Take 1 tablet (300 mg total) by mouth once daily. 90 tablet 3    amLODIPine (NORVASC) 5 MG tablet Take 1 tablet (5 mg total) by mouth once daily. 90 tablet 4    ammonium lactate (LAC-HYDRIN) 12 % lotion Apply to damp skin after bathing 225 g 11    benzonatate (TESSALON) 200 MG capsule take 1 BY MOUTH THREE TIMES DAILY AS NEEDED FOR COUGH 30 capsule 4    colchicine (COLCRYS) 0.6 mg tablet Take 1 tablet (0.6 mg total) by mouth once daily. 7 tablet 0    colchicine-probenecid 0.5-500 mg (CO-BENEMID) 500-0.5  "mg Tab Take 1 tablet by mouth once daily. 7 tablet 2    diclofenac (VOLTAREN) 75 MG EC tablet Take 1 tablet (75 mg total) by mouth 2 (two) times daily. 60 tablet 3    FLUoxetine 20 MG capsule Take 1 capsule (20 mg total) by mouth once daily. 90 capsule 4    fluticasone propionate (FLONASE) 50 mcg/actuation nasal spray 1 spray (50 mcg total) by Each Nostril route once daily. 10 mL 5    gabapentin (NEURONTIN) 300 MG capsule Take 1 capsule (300 mg total) by mouth 3 (three) times daily as needed (pain). 270 capsule 3    loratadine (CLARITIN) 10 mg tablet Take 1 tablet (10 mg total) by mouth once daily. 90 tablet 3    meclizine (ANTIVERT) 25 mg tablet Take 1 tablet (25 mg total) by mouth 3 (three) times daily as needed. 50 tablet 2    metoprolol succinate (TOPROL-XL) 100 MG 24 hr tablet Take 1 tablet (100 mg total) by mouth once daily. 90 tablet 4    neomycin-polymyxin-hydrocortisone (CORTISPORIN) 3.5-10,000-0.5 mg/g-unit/g-% cream Apply topically 2 (two) times daily. 10 g 2    neomycin-polymyxin-hydrocortisone (CORTISPORIN) 3.5-10,000-1 mg/mL-unit/mL-% otic suspension Place 3 drops into both ears 3 (three) times daily. 10 mL 0    omeprazole (PRILOSEC) 20 MG capsule Take 1 capsule (20 mg total) by mouth once daily. 90 capsule 3    topiramate (TOPAMAX) 50 MG tablet Take 1 tablet (50 mg total) by mouth every evening. 90 tablet 4    triamcinolone acetonide 0.1% (KENALOG) 0.1 % cream Apply topically 2 (two) times daily as needed (dryness or itching). 80 g 3    valsartan (DIOVAN) 320 MG tablet Take 1 tablet (320 mg total) by mouth once daily. 90 tablet 3     No current facility-administered medications for this visit.        The patient's relevant past medical, surgical, and social history was reviewed in Epic.       Objective:      VITAL SIGNS: BP (!) 143/92   Pulse (!) 114   Ht 5' 8" (1.727 m)   Wt 127.2 kg (280 lb 6.8 oz)   BMI 42.64 kg/m²     General    Nursing note and vitals reviewed.  Constitutional: She is " oriented to person, place, and time. She appears well-developed and well-nourished.   Neurological: She is alert and oriented to person, place, and time.     General Musculoskeletal Exam   Gait: antalgic       Right Knee Exam     Inspection   Swelling: present    Tenderness   The patient is tender to palpation of the lateral joint line.    Range of Motion   Extension:  abnormal   Flexion:  abnormal     Tests   Ligament Examination   Lachman: normal (-1 to 2mm)     Other   Sensation: normal    Left Knee Exam     Tenderness   The patient tender to palpation of the medial joint line.    Range of Motion   Extension:  abnormal   Flexion:  abnormal     Tests   Stability   Lachman: normal (-1 to 2mm)     Other   Sensation: normal    Muscle Strength   Right Lower Extremity   Quadriceps:  5/5   Hamstrin/5   Left Lower Extremity   Quadriceps:  5/5   Hamstrin/5            Assessment:       1. Primary osteoarthritis of right knee    2. Acute pain of right knee    3. Acute pain of left knee    4. Primary osteoarthritis of left knee          Plan:         Debbie was seen today for pain.    Diagnoses and all orders for this visit:    Primary osteoarthritis of right knee  -     Prior authorization Order  -     X-ray Knee Ortho Bilateral; Future  -     Large Joint Aspiration/Injection: bilateral knee    Acute pain of right knee    Acute pain of left knee    Primary osteoarthritis of left knee  -     Prior authorization Order  -     X-ray Knee Ortho Bilateral; Future  -     Large Joint Aspiration/Injection: bilateral knee    Bilateral knee osteoarthritis.  Will order new x-rays today.  She had 3 months relief from Zilretta injection.  I gave her the options of trying Zilretta again or possibly trying gel injections.  She is interested in gel injections at this time.  I will place an order for Gelsyn-3 hyaluronic acid injections.  She will return once these are approved.  Toradol given today for acute pain.    Diagnoses and  plan discussed with the patient, as well as the expected course and duration of his symptoms.  All questions and concerns were addressed prior to the end of the visit.   Instructed patient to call office if they have any future questions/concerns or to schedule apt. Patient will return to see me if symptoms worsen or fail to improve    Note dictated with voice recognition software, please excuse any grammatical errors.        Arely Gale PA-C   08/24/2023     none

## 2024-08-20 NOTE — ED ADULT NURSE REASSESSMENT NOTE - NS ED NURSE REASSESS COMMENT FT1
Contacted Dr. Renteria in regard to NS/Potassium fluid administration. As per MD, fluids to be stopped, ordered in error. Patient received 600ml, MD aware. Discussed patient's elvated BP with MD, Norvasc to be ordered. Patient in no respiratory or cardiac distress.

## 2024-08-20 NOTE — CONSULT NOTE ADULT - ASSESSMENT
Imp: Biliary pancreatitis    Rec:  ::EUS/ERCP today   43 year old male with acute abdominal pain, nausea, vomiting, jaundice with biliary dilatation on imaging, inflammation of pancreas and distended gallbladder with leukocytosis    Imp: Biliary pancreatitis 2/2 choledocholithiasis versus passing stones/sludge    Rec:  ::EUS/ERCP today  ::Maintain NPO  ::IVF maintenance rate  ::Pain control  ::Antiemetics  ::Continue trend LFT's  ::Choley per surgery post ERCP   43 year old male with acute abdominal pain, nausea, vomiting, jaundice with biliary dilatation on imaging, inflammation of pancreas and distended gallbladder with leukocytosis    Imp: Biliary pancreatitis 2/2 choledocholithiasis versus passing of stones/sludge    Rec:  ::EUS/ERCP today  ::Maintain NPO  ::IVF maintenance rate  ::Pain control  ::Antiemetics  ::Continue trend LFT's  ::Choley per surgery post ERCP

## 2024-08-20 NOTE — PROGRESS NOTE ADULT - ASSESSMENT
42 y/o male with a PMHx of gastritis, perianal fistula, HTN on current dietary /lifestyle modification who presented to   [facility]  ED c/o vomiting and abd pain since yesterday.   Pt reports he ate around 11am yesterday and since then he has had abd pain and vomiting.  Pt denies fevers.   He denies cpain/SOB,  no urinary or resp complaints,  no edema/calf pain.  Pt reports he had some symptoms of abd pain 3 weeks ago and was advised by his provider to have imaging.            weakness  severe abd pain  Pancreatitis with transaminitis,  likely due to gallstones  Nausea/ vomiting  Dehydration  Leukocytosis  Thrombocytopenia  Hypertension  Bradycardia on ED telemetry HR 40's - 50's,  asymptomatic     Hx of gastritis  Hx of perianal fistula  Hx of HTN on dietary /lifestyle modification    Abn CT scan shows  :   BILE DUCTS: Mild intrahepatic and extra hepatic biliary ductal   dilatation. CBD measures 10 mm in diameter.  GALLBLADDER: Distended gallbladder.  SPLEEN: Within normal limits.  PANCREAS: Tenderness pancreas. Moderate peripancreatic fluid. No   peripancreatic fluid collection. No pancreatic ductal dilatation.      ( see full report)     - admit to med, remote telemetry due to bradycardia  - s/p 2 L NS in the ED, cont  IVF,  will add 175ml/hr   - s/p Zosyn iv in ED, cont for now  - NPO,  - pain control  - supportive care  - apprec surg consult by Dr. Romo and team  - GI consult ,recommendations appreciated   - norvasv 5mg for HTN  - outpt f/u with PCP for HTN  -ERCP today  -Possible cholecystectomy afterwards    - DVT proph: SCDS,  ambulation.   - Full Code

## 2024-08-21 LAB
ALBUMIN SERPL ELPH-MCNC: 2.8 G/DL — LOW (ref 3.3–5)
ALP SERPL-CCNC: 109 U/L — SIGNIFICANT CHANGE UP (ref 40–120)
ALT FLD-CCNC: 241 U/L — HIGH (ref 12–78)
ANION GAP SERPL CALC-SCNC: 3 MMOL/L — LOW (ref 5–17)
AST SERPL-CCNC: 52 U/L — HIGH (ref 15–37)
BILIRUB SERPL-MCNC: 2.4 MG/DL — HIGH (ref 0.2–1.2)
BUN SERPL-MCNC: 12 MG/DL — SIGNIFICANT CHANGE UP (ref 7–23)
CALCIUM SERPL-MCNC: 8.1 MG/DL — LOW (ref 8.5–10.1)
CHLORIDE SERPL-SCNC: 106 MMOL/L — SIGNIFICANT CHANGE UP (ref 96–108)
CO2 SERPL-SCNC: 31 MMOL/L — SIGNIFICANT CHANGE UP (ref 22–31)
CREAT SERPL-MCNC: 1.11 MG/DL — SIGNIFICANT CHANGE UP (ref 0.5–1.3)
EGFR: 84 ML/MIN/1.73M2 — SIGNIFICANT CHANGE UP
GLUCOSE SERPL-MCNC: 106 MG/DL — HIGH (ref 70–99)
HCT VFR BLD CALC: 39.6 % — SIGNIFICANT CHANGE UP (ref 39–50)
HGB BLD-MCNC: 13.4 G/DL — SIGNIFICANT CHANGE UP (ref 13–17)
MCHC RBC-ENTMCNC: 28.5 PG — SIGNIFICANT CHANGE UP (ref 27–34)
MCHC RBC-ENTMCNC: 33.8 GM/DL — SIGNIFICANT CHANGE UP (ref 32–36)
MCV RBC AUTO: 84.1 FL — SIGNIFICANT CHANGE UP (ref 80–100)
PLATELET # BLD AUTO: 116 K/UL — LOW (ref 150–400)
POTASSIUM SERPL-MCNC: 4 MMOL/L — SIGNIFICANT CHANGE UP (ref 3.5–5.3)
POTASSIUM SERPL-SCNC: 4 MMOL/L — SIGNIFICANT CHANGE UP (ref 3.5–5.3)
PROT SERPL-MCNC: 5.7 GM/DL — LOW (ref 6–8.3)
RBC # BLD: 4.71 M/UL — SIGNIFICANT CHANGE UP (ref 4.2–5.8)
RBC # FLD: 13.2 % — SIGNIFICANT CHANGE UP (ref 10.3–14.5)
SODIUM SERPL-SCNC: 140 MMOL/L — SIGNIFICANT CHANGE UP (ref 135–145)
WBC # BLD: 14.29 K/UL — HIGH (ref 3.8–10.5)
WBC # FLD AUTO: 14.29 K/UL — HIGH (ref 3.8–10.5)

## 2024-08-21 PROCEDURE — 93306 TTE W/DOPPLER COMPLETE: CPT | Mod: 26

## 2024-08-21 PROCEDURE — 93010 ELECTROCARDIOGRAM REPORT: CPT

## 2024-08-21 PROCEDURE — 76376 3D RENDER W/INTRP POSTPROCES: CPT | Mod: 26

## 2024-08-21 PROCEDURE — 99232 SBSQ HOSP IP/OBS MODERATE 35: CPT

## 2024-08-21 RX ORDER — LOSARTAN POTASSIUM 50 MG/1
25 TABLET ORAL DAILY
Refills: 0 | Status: DISCONTINUED | OUTPATIENT
Start: 2024-08-21 | End: 2024-08-27

## 2024-08-21 RX ADMIN — Medication 4 MILLIGRAM(S): at 19:17

## 2024-08-21 RX ADMIN — Medication 1 TABLET(S): at 09:50

## 2024-08-21 RX ADMIN — Medication 4 MILLIGRAM(S): at 18:35

## 2024-08-21 RX ADMIN — Medication 40 MILLIGRAM(S): at 05:18

## 2024-08-21 RX ADMIN — AMLODIPINE BESYLATE 5 MILLIGRAM(S): 10 TABLET ORAL at 09:50

## 2024-08-21 RX ADMIN — PIPERACILLIN SODIUM AND TAZOBACTAM SODIUM 25 GRAM(S): 3; .375 INJECTION, POWDER, FOR SOLUTION INTRAVENOUS at 05:18

## 2024-08-21 RX ADMIN — PIPERACILLIN SODIUM AND TAZOBACTAM SODIUM 25 GRAM(S): 3; .375 INJECTION, POWDER, FOR SOLUTION INTRAVENOUS at 14:24

## 2024-08-21 RX ADMIN — Medication 100 MILLIGRAM(S): at 09:50

## 2024-08-21 RX ADMIN — Medication 1 MILLIGRAM(S): at 09:50

## 2024-08-21 NOTE — PROVIDER CONTACT NOTE (OTHER) - BACKGROUND
Patient admitted with biliary pancreatitis. Elevated liver enzymes. S/p EUS/ERCP. History of gastritis and perianal fistula.

## 2024-08-21 NOTE — PROGRESS NOTE ADULT - SUBJECTIVE AND OBJECTIVE BOX
HOSPITALIST ATTENDING PROGRESS NOTE    Chart and meds reviewed.      Subjective: Patient seen and examined. resting comfrotably, abdominal pain is improved, currently mild pain. S/P ERCP with sphincterotomy, stone removal, stent placement 8/20. Plan for lap carine friday. tolerating clears. no BM at this time. Episode of bradycardia and Mobitz Type 1 heart block, will consult EP , recommendations appreciated       Additional results/Imaging, I have personally reviewed:    LABS:                            13.4   14.29 )-----------( 116      ( 21 Aug 2024 06:27 )             39.6     08-21    140  |  106  |  12  ----------------------------<  106<H>  4.0   |  31  |  1.11    Ca    8.1<L>      21 Aug 2024 06:27    TPro  5.7<L>  /  Alb  2.8<L>  /  TBili  2.4<H>  /  DBili  x   /  AST  52<H>  /  ALT  241<H>  /  AlkPhos  109  08-21        LIVER FUNCTIONS - ( 21 Aug 2024 06:27 )  Alb: 2.8 g/dL / Pro: 5.7 gm/dL / ALK PHOS: 109 U/L / ALT: 241 U/L / AST: 52 U/L / GGT: x           PT/INR - ( 19 Aug 2024 18:22 )   PT: 12.0 sec;   INR: 1.06 ratio         PTT - ( 19 Aug 2024 18:22 )  PTT:35.5 sec  Urinalysis Basic - ( 21 Aug 2024 06:27 )    Color: x / Appearance: x / SG: x / pH: x  Gluc: 106 mg/dL / Ketone: x  / Bili: x / Urobili: x   Blood: x / Protein: x / Nitrite: x   Leuk Esterase: x / RBC: x / WBC x   Sq Epi: x / Non Sq Epi: x / Bacteria: x              All other systems reviewed and found to be negative with the exception of what has been described above.    MEDICATIONS  (STANDING):  amLODIPine   Tablet 5 milliGRAM(s) Oral daily  folic acid 1 milliGRAM(s) Oral daily  multivitamin 1 Tablet(s) Oral daily  pantoprazole    Tablet 40 milliGRAM(s) Oral before breakfast  piperacillin/tazobactam IVPB.. 3.375 Gram(s) IV Intermittent every 8 hours  sodium chloride 0.9%. 2000 milliLiter(s) (175 mL/Hr) IV Continuous <Continuous>  thiamine 100 milliGRAM(s) Oral daily    MEDICATIONS  (PRN):  morphine  - Injectable 4 milliGRAM(s) IV Push every 4 hours PRN Severe Pain (7 - 10)  ondansetron Injectable 4 milliGRAM(s) IV Push every 6 hours PRN Nausea and/or Vomiting      VITALS:  T(F): 98.3 (08-21-24 @ 07:41), Max: 98.4 (08-20-24 @ 15:57)  HR: 64 (08-21-24 @ 07:41) (46 - 70)  BP: 136/87 (08-21-24 @ 07:41) (131/82 - 151/96)  RR: 18 (08-21-24 @ 07:41) (14 - 18)  SpO2: 98% (08-21-24 @ 07:41) (97% - 99%)  Wt(kg): --    I&O's Summary    20 Aug 2024 07:01  -  21 Aug 2024 07:00  --------------------------------------------------------  IN: 500 mL / OUT: 800 mL / NET: -300 mL        CAPILLARY BLOOD GLUCOSE          PHYSICAL EXAM:  Gen: No acute distress   HEENT:  pupils equal and reactive, EOMI,   NECK:   supple, no carotid bruits, No JVD  CV:  +S1, +S2, regular rate rhythm, no murmurs or rubs  RESP:   lungs clear to auscultation bilaterally, no wheezing, rales, rhonchi, good air entry bilaterally  GI:  abdomen soft, non-tender, non-distended, normal BS, no bruits, no abdominal masses, no palpable masses  MSK:   normal muscle tone, no atrophy, no rigidity, no contractions  EXT:  no clubbing, no cyanosis, no edema, no calf pain, swelling or erythema  VASCULAR:  pulses equal and symmetric in the upper and lower extremities  NEURO:  AAOX3, no focal neurological deficits, follows all commands, able to move extremities spontaneously  SKIN:  no ulcers, lesions or rashes      CULTURES:      Telemetry, personally reviewed

## 2024-08-21 NOTE — PROGRESS NOTE ADULT - ASSESSMENT
52 yo male with biliary pancreatitis, LFTs Trending down after ERCP  - Tolerating Clears diet  - Lap Tonia planned on Friday 8/23, NPO MN@ 8/22, preop  d/w Dr. Jeffrey.   52 yo male with biliary pancreatitis, LFTs Trending down after ERCP  - Tolerating Clears diet  - Lap Tonia planned on Friday 8/23, NPO MN@ 8/22, preop coag, T&S f/u on FRI AM.  d/w Dr. Jeffrey.

## 2024-08-21 NOTE — PROGRESS NOTE ADULT - ASSESSMENT
42 y/o male with a PMHx of gastritis, perianal fistula, HTN on current dietary /lifestyle modification who presented to   [facility]  ED c/o vomiting and abd pain since yesterday.   Pt reports he ate around 11am yesterday and since then he has had abd pain and vomiting.  Pt denies fevers.   He denies cpain/SOB,  no urinary or resp complaints,  no edema/calf pain.  Pt reports he had some symptoms of abd pain 3 weeks ago and was advised by his provider to have imaging.      Gallstone pancreatitis   GI and surgery consulted, recommendations appreciated.   S/P ERCP 8/20 ,  with sphincterotomy, stone removal, stent placement  Plan for Lap Tonia On Friday 8/23  Continue Clear liquid Diet.   Continue IV Zosyn   Continue pain control and supportive care    Thrombocytopenia  Continue to monitor     Hypertension  Bradycardia on ED telemetry HR 40's - 50's,  asymptomatic   Episodes of Mobitz type 1 Heart Block  Will DC Amlodipine , start losartan  EP consulted recommendations appreciated       Hx of gastritis  Continue Protonix        - DVT proph: SCDS,  ambulation.   - Full Code

## 2024-08-21 NOTE — PROGRESS NOTE ADULT - SUBJECTIVE AND OBJECTIVE BOX
43 year old male with significant past medical history of gastritis, perianal fistula, HTN, not on medication presenting to [facility] with vomiting and diffuse abdominal pain. Initial CT imaging with biliary dilatation, pancreatitis, and distended gallbladder. A/W Biliary pancreatitis. S/P ERCP with sphincterotomy, stone removal, stent placement by Dr. Goodman    Vital Signs Last 24 Hrs  T(C): 36.8 (21 Aug 2024 07:41), Max: 36.9 (20 Aug 2024 15:57)  T(F): 98.3 (21 Aug 2024 07:41), Max: 98.4 (20 Aug 2024 15:57)  HR: 64 (21 Aug 2024 07:41) (46 - 70)  BP: 136/87 (21 Aug 2024 07:41) (131/82 - 151/96)  BP(mean): --  RR: 18 (21 Aug 2024 07:41) (14 - 18)  SpO2: 98% (21 Aug 2024 07:41) (97% - 99%)    Parameters below as of 21 Aug 2024 07:41  Patient On (Oxygen Delivery Method): room air        I&O's Summary    20 Aug 2024 07:01  -  21 Aug 2024 07:00  --------------------------------------------------------  IN: 500 mL / OUT: 800 mL / NET: -300 mL    PHYSICAL EXAM:  CV-RRR, S1, S2  Lungs- CTA b/L, no wheezes  ABD- non-distended, +BS, +tenderness to palpation RUQ/RLQ,/ LUQ/ LLQ and umbilical, no guarding, no peritoneal signs      MEDICATIONS:  MEDICATIONS  (STANDING):  amLODIPine   Tablet 5 milliGRAM(s) Oral daily  folic acid 1 milliGRAM(s) Oral daily  multivitamin 1 Tablet(s) Oral daily  pantoprazole    Tablet 40 milliGRAM(s) Oral before breakfast  piperacillin/tazobactam IVPB.. 3.375 Gram(s) IV Intermittent every 8 hours  sodium chloride 0.9%. 2000 milliLiter(s) (175 mL/Hr) IV Continuous <Continuous>  thiamine 100 milliGRAM(s) Oral daily      LABS: All Labs Reviewed:                        13.4   14.29 )-----------( 116      ( 21 Aug 2024 06:27 )             39.6     08-21    140  |  106  |  12  ----------------------------<  106<H>  4.0   |  31  |  1.11    Ca    8.1<L>      21 Aug 2024 06:27    TPro  5.7<L>  /  Alb  2.8<L>  /  TBili  2.4<H>  /  DBili  x   /  AST  52<H>  /  ALT  241<H>  /  AlkPhos  109  08-21    PT/INR - ( 19 Aug 2024 18:22 )   PT: 12.0 sec;   INR: 1.06 ratio         PTT - ( 19 Aug 2024 18:22 )  PTT:35.5 sec      Blood Culture: 08-19 @ 18:22  Organism --  Gram Stain Blood -- Gram Stain --  Specimen Source .Blood None  Culture-Blood --        RADIOLOGY/EKG:  < from: CT Abdomen and Pelvis w/ IV Cont (08.19.24 @ 18:11) >  Acute interstitial pancreatitis. No peripancreatic fluid collection.    Distended gallbladder. Mild intrahepatic and extrahepatic biliaryductal   dilatation. Consider further evaluation with MRCP to exclude   choledocholithiasis.        DVT PPX:    ADVANCED DIRECTIVE:    DISPOSITION: 43 year old male with significant past medical history of gastritis, perianal fistula, HTN, not on medication presenting to [facility] with vomiting and diffuse abdominal pain. Initial CT imaging with biliary dilatation, pancreatitis, and distended gallbladder. A/W Biliary pancreatitis. S/P ERCP with sphincterotomy, stone removal, stent placement by Dr. Goodman    Vital Signs Last 24 Hrs  T(C): 36.8 (21 Aug 2024 07:41), Max: 36.9 (20 Aug 2024 15:57)  T(F): 98.3 (21 Aug 2024 07:41), Max: 98.4 (20 Aug 2024 15:57)  HR: 64 (21 Aug 2024 07:41) (46 - 70)  BP: 136/87 (21 Aug 2024 07:41) (131/82 - 151/96)  BP(mean): --  RR: 18 (21 Aug 2024 07:41) (14 - 18)  SpO2: 98% (21 Aug 2024 07:41) (97% - 99%)    Parameters below as of 21 Aug 2024 07:41  Patient On (Oxygen Delivery Method): room air        I&O's Summary    20 Aug 2024 07:01  -  21 Aug 2024 07:00  --------------------------------------------------------  IN: 500 mL / OUT: 800 mL / NET: -300 mL    PHYSICAL EXAM:  CV-RRR, S1, S2  Lungs- CTA b/L, no wheezes  ABD- non-distended, +BS, +tenderness to palpation RUQ/RLQ,/ LUQ/ LLQ and umbilical, no guarding, no peritoneal signs      MEDICATIONS:  MEDICATIONS  (STANDING):  amLODIPine   Tablet 5 milliGRAM(s) Oral daily  folic acid 1 milliGRAM(s) Oral daily  multivitamin 1 Tablet(s) Oral daily  pantoprazole    Tablet 40 milliGRAM(s) Oral before breakfast  piperacillin/tazobactam IVPB.. 3.375 Gram(s) IV Intermittent every 8 hours  sodium chloride 0.9%. 2000 milliLiter(s) (175 mL/Hr) IV Continuous <Continuous>  thiamine 100 milliGRAM(s) Oral daily      LABS: All Labs Reviewed:                        13.4   14.29 )-----------( 116      ( 21 Aug 2024 06:27 )             39.6     08-21    140  |  106  |  12  ----------------------------<  106<H>  4.0   |  31  |  1.11    Ca    8.1<L>      21 Aug 2024 06:27    TPro  5.7<L>  /  Alb  2.8<L>  /  TBili  2.4<H>  /  DBili  x   /  AST  52<H>  /  ALT  241<H>  /  AlkPhos  109  08-21    PT/INR - ( 19 Aug 2024 18:22 )   PT: 12.0 sec;   INR: 1.06 ratio         PTT - ( 19 Aug 2024 18:22 )  PTT:35.5 sec      Blood Culture: 08-19 @ 18:22  Organism --  Gram Stain Blood -- Gram Stain --  Specimen Source .Blood None  Culture-Blood --        RADIOLOGY/EKG:  < from: CT Abdomen and Pelvis w/ IV Cont (08.19.24 @ 18:11) >  Acute interstitial pancreatitis. No peripancreatic fluid collection.    Distended gallbladder. Mild intrahepatic and extrahepatic biliaryductal   dilatation. Consider further evaluation with MRCP to exclude   choledocholithiasis.

## 2024-08-22 LAB
ADD ON TEST-SPECIMEN IN LAB: SIGNIFICANT CHANGE UP
ALBUMIN SERPL ELPH-MCNC: 2.8 G/DL — LOW (ref 3.3–5)
ALP SERPL-CCNC: 99 U/L — SIGNIFICANT CHANGE UP (ref 40–120)
ALT FLD-CCNC: 173 U/L — HIGH (ref 12–78)
ANION GAP SERPL CALC-SCNC: 4 MMOL/L — LOW (ref 5–17)
AST SERPL-CCNC: 31 U/L — SIGNIFICANT CHANGE UP (ref 15–37)
BILIRUB DIRECT SERPL-MCNC: 0.5 MG/DL — HIGH (ref 0–0.3)
BILIRUB INDIRECT FLD-MCNC: 1.7 MG/DL — HIGH (ref 0.2–1)
BILIRUB SERPL-MCNC: 2.2 MG/DL — HIGH (ref 0.2–1.2)
BUN SERPL-MCNC: 10 MG/DL — SIGNIFICANT CHANGE UP (ref 7–23)
CALCIUM SERPL-MCNC: 8.3 MG/DL — LOW (ref 8.5–10.1)
CHLORIDE SERPL-SCNC: 105 MMOL/L — SIGNIFICANT CHANGE UP (ref 96–108)
CO2 SERPL-SCNC: 30 MMOL/L — SIGNIFICANT CHANGE UP (ref 22–31)
CREAT SERPL-MCNC: 1.09 MG/DL — SIGNIFICANT CHANGE UP (ref 0.5–1.3)
EGFR: 86 ML/MIN/1.73M2 — SIGNIFICANT CHANGE UP
GLUCOSE SERPL-MCNC: 104 MG/DL — HIGH (ref 70–99)
HCT VFR BLD CALC: 40.6 % — SIGNIFICANT CHANGE UP (ref 39–50)
HGB BLD-MCNC: 13.9 G/DL — SIGNIFICANT CHANGE UP (ref 13–17)
LIDOCAIN IGE QN: 127 U/L — HIGH (ref 13–75)
MCHC RBC-ENTMCNC: 28.8 PG — SIGNIFICANT CHANGE UP (ref 27–34)
MCHC RBC-ENTMCNC: 34.2 GM/DL — SIGNIFICANT CHANGE UP (ref 32–36)
MCV RBC AUTO: 84.2 FL — SIGNIFICANT CHANGE UP (ref 80–100)
PLATELET # BLD AUTO: 118 K/UL — LOW (ref 150–400)
POTASSIUM SERPL-MCNC: 3.4 MMOL/L — LOW (ref 3.5–5.3)
POTASSIUM SERPL-SCNC: 3.4 MMOL/L — LOW (ref 3.5–5.3)
PROT SERPL-MCNC: 6.3 GM/DL — SIGNIFICANT CHANGE UP (ref 6–8.3)
RBC # BLD: 4.82 M/UL — SIGNIFICANT CHANGE UP (ref 4.2–5.8)
RBC # FLD: 13.3 % — SIGNIFICANT CHANGE UP (ref 10.3–14.5)
SODIUM SERPL-SCNC: 139 MMOL/L — SIGNIFICANT CHANGE UP (ref 135–145)
WBC # BLD: 10.94 K/UL — HIGH (ref 3.8–10.5)
WBC # FLD AUTO: 10.94 K/UL — HIGH (ref 3.8–10.5)

## 2024-08-22 PROCEDURE — 93010 ELECTROCARDIOGRAM REPORT: CPT

## 2024-08-22 PROCEDURE — 99232 SBSQ HOSP IP/OBS MODERATE 35: CPT

## 2024-08-22 RX ORDER — POTASSIUM CHLORIDE 10 MEQ
20 TABLET, EXT RELEASE, PARTICLES/CRYSTALS ORAL
Refills: 0 | Status: COMPLETED | OUTPATIENT
Start: 2024-08-22 | End: 2024-08-22

## 2024-08-22 RX ADMIN — Medication 1 TABLET(S): at 09:36

## 2024-08-22 RX ADMIN — Medication 40 MILLIGRAM(S): at 09:37

## 2024-08-22 RX ADMIN — Medication 1 MILLIGRAM(S): at 09:36

## 2024-08-22 RX ADMIN — Medication 20 MILLIEQUIVALENT(S): at 11:53

## 2024-08-22 RX ADMIN — Medication 100 MILLIGRAM(S): at 09:36

## 2024-08-22 RX ADMIN — LOSARTAN POTASSIUM 25 MILLIGRAM(S): 50 TABLET ORAL at 09:37

## 2024-08-22 RX ADMIN — PIPERACILLIN SODIUM AND TAZOBACTAM SODIUM 25 GRAM(S): 3; .375 INJECTION, POWDER, FOR SOLUTION INTRAVENOUS at 00:03

## 2024-08-22 RX ADMIN — PIPERACILLIN SODIUM AND TAZOBACTAM SODIUM 25 GRAM(S): 3; .375 INJECTION, POWDER, FOR SOLUTION INTRAVENOUS at 05:45

## 2024-08-22 RX ADMIN — PIPERACILLIN SODIUM AND TAZOBACTAM SODIUM 25 GRAM(S): 3; .375 INJECTION, POWDER, FOR SOLUTION INTRAVENOUS at 22:11

## 2024-08-22 RX ADMIN — PIPERACILLIN SODIUM AND TAZOBACTAM SODIUM 25 GRAM(S): 3; .375 INJECTION, POWDER, FOR SOLUTION INTRAVENOUS at 14:05

## 2024-08-22 RX ADMIN — Medication 20 MILLIEQUIVALENT(S): at 09:36

## 2024-08-22 NOTE — PROGRESS NOTE ADULT - ASSESSMENT
42 y/o male with a PMHx of gastritis, perianal fistula, HTN on current dietary /lifestyle modification who presented to   [facility]  ED c/o vomiting and abd pain since yesterday.   Pt reports he ate around 11am yesterday and since then he has had abd pain and vomiting.  Pt denies fevers.   He denies cpain/SOB,  no urinary or resp complaints,  no edema/calf pain.  Pt reports he had some symptoms of abd pain 3 weeks ago and was advised by his provider to have imaging.  admitted to the hospitalist service for gallstone pancreatitis.     Gallstone pancreatitis   GI and surgery consulted, recommendations appreciated.   S/P ERCP 8/20 ,  with sphincterotomy, stone removal, stent placement  Plan for Lap Tonia On monday 8/26  Continue Clear liquid Diet.   Continue IV Zosyn   Continue pain control and supportive care    Thrombocytopenia  Continue to monitor     Hypertension  Bradycardia on ED telemetry HR 40's - 50's,  asymptomatic   Episodes of Mobitz type 1 Heart Block  DC Amlodipine , start losartan  EP consulted recommendations appreciated   HR in 70s this AM   Repeat EKG       Hx of gastritis  Continue Protonix        - DVT proph: SCDS,  ambulation.   - Full Code

## 2024-08-22 NOTE — PROGRESS NOTE ADULT - ASSESSMENT
44 yo male with biliary pancreatitis, LFTs Trending down after ERCP  - Tolerating Clears diet  - Lap Tonia planned on Friday 8/23, NPO MN@ 8/22, preop coag, T&S f/u on FRI AM.  d/w Dr. Jeffrey. 42 yo male with biliary pancreatitis, LFTs Trending down after ERCP  - Tolerating Clears diet  - Lap Tonia planned on Monday 8/26, NPO MN@ 8/25, preop coag, T&S .  d/w Dr. Jeffrey.

## 2024-08-22 NOTE — PROGRESS NOTE ADULT - SUBJECTIVE AND OBJECTIVE BOX
HOSPITALIST ATTENDING PROGRESS NOTE    Chart and meds reviewed.      HPI: 44 y/o male with a PMHx of gastritis, perianal fistula, HTN on current dietary /lifestyle modification who presented to   [facility]  ED c/o vomiting and abd pain since yesterday.   Pt reports he ate around 11am yesterday and since then he has had abd pain and vomiting.  Pt denies fevers.   He denies cpain/SOB,  no urinary or resp complaints,  no edema/calf pain.  Pt reports he had some symptoms of abd pain 3 weeks ago and was advised by his provider to have imaging.  He has a follow up with his PCP for high blood pressure in Sept 2024. Admitted to the hospitalist service for gallstone pancreatitis.       Subjective: Patient seen and examined. S/P ERCP 8/20. resting comfortably. Abdominal pain continues to improve. repeat Lipase 127 this AM . mild irritation with clear liquids but tolerable. Plan for lap carine monday.         Additional results/Imaging, I have personally reviewed:    LABS:                            13.9   10.94 )-----------( 118      ( 22 Aug 2024 06:03 )             40.6     08-22    139  |  105  |  10  ----------------------------<  104<H>  3.4<L>   |  30  |  1.09    Ca    8.3<L>      22 Aug 2024 06:03    TPro  6.3  /  Alb  2.8<L>  /  TBili  2.2<H>  /  DBili  0.5<H>  /  AST  31  /  ALT  173<H>  /  AlkPhos  99  08-22        LIVER FUNCTIONS - ( 22 Aug 2024 06:03 )  Alb: 2.8 g/dL / Pro: 6.3 gm/dL / ALK PHOS: 99 U/L / ALT: 173 U/L / AST: 31 U/L / GGT: x             Urinalysis Basic - ( 22 Aug 2024 06:03 )    Color: x / Appearance: x / SG: x / pH: x  Gluc: 104 mg/dL / Ketone: x  / Bili: x / Urobili: x   Blood: x / Protein: x / Nitrite: x   Leuk Esterase: x / RBC: x / WBC x   Sq Epi: x / Non Sq Epi: x / Bacteria: x              All other systems reviewed and found to be negative with the exception of what has been described above.    MEDICATIONS  (STANDING):  folic acid 1 milliGRAM(s) Oral daily  losartan 25 milliGRAM(s) Oral daily  multivitamin 1 Tablet(s) Oral daily  pantoprazole    Tablet 40 milliGRAM(s) Oral before breakfast  piperacillin/tazobactam IVPB.. 3.375 Gram(s) IV Intermittent every 8 hours  sodium chloride 0.9%. 2000 milliLiter(s) (175 mL/Hr) IV Continuous <Continuous>  thiamine 100 milliGRAM(s) Oral daily    MEDICATIONS  (PRN):  morphine  - Injectable 4 milliGRAM(s) IV Push every 4 hours PRN Severe Pain (7 - 10)  ondansetron Injectable 4 milliGRAM(s) IV Push every 6 hours PRN Nausea and/or Vomiting      VITALS:  T(F): 98.2 (08-22-24 @ 07:59), Max: 98.6 (08-21-24 @ 21:10)  HR: 70 (08-22-24 @ 07:59) (70 - 79)  BP: 138/98 (08-22-24 @ 07:59) (132/75 - 138/98)  RR: 18 (08-22-24 @ 07:59) (18 - 18)  SpO2: 94% (08-22-24 @ 07:59) (94% - 97%)  Wt(kg): --    I&O's Summary      CAPILLARY BLOOD GLUCOSE          PHYSICAL EXAM:  Gen: No acute distress   HEENT:  pupils equal and reactive, EOMI,   NECK:   supple, no carotid bruits, No JVD  CV:  +S1, +S2, regular rate rhythm, no murmurs or rubs  RESP:   lungs clear to auscultation bilaterally, no wheezing, rales, rhonchi, good air entry bilaterally  GI:  abdomen soft, non-distended, normal BS, Tender to palpation upper abdomen bilaterally.   MSK:   normal muscle tone, no atrophy, no rigidity, no contractions  EXT:  no clubbing, no cyanosis, no edema, no calf pain, swelling or erythema  VASCULAR:  pulses equal and symmetric in the upper and lower extremities  NEURO:  AAOX3, no focal neurological deficits, follows all commands, able to move extremities spontaneously  SKIN:  no ulcers, lesions or rashes        CULTURES:      Telemetry, personally reviewed

## 2024-08-22 NOTE — PROGRESS NOTE ADULT - SUBJECTIVE AND OBJECTIVE BOX
43 year old male with significant past medical history of gastritis, perianal fistula, HTN, not on medication presenting to [facility] with vomiting and diffuse abdominal pain. Initial CT imaging with biliary dilatation, pancreatitis, and distended gallbladder. A/W Biliary pancreatitis. S/P ERCP with sphincterotomy, stone removal, stent placement by Dr. Goodman  Gen Surg EDMUND Lang: Patient is without significant pain, pre-op labs/orders in for Cholecystectomy tomorrow, pt aware of plan    folic acid 1 milliGRAM(s) Oral daily  losartan 25 milliGRAM(s) Oral daily  morphine  - Injectable 4 milliGRAM(s) IV Push every 4 hours PRN  multivitamin 1 Tablet(s) Oral daily  ondansetron Injectable 4 milliGRAM(s) IV Push every 6 hours PRN  pantoprazole    Tablet 40 milliGRAM(s) Oral before breakfast  piperacillin/tazobactam IVPB.. 3.375 Gram(s) IV Intermittent every 8 hours  sodium chloride 0.9%. 2000 milliLiter(s) IV Continuous <Continuous>  thiamine 100 milliGRAM(s) Oral daily      Vital Signs Last 24 Hrs  T(C): 36.8 (22 Aug 2024 07:59), Max: 37 (21 Aug 2024 21:10)  T(F): 98.2 (22 Aug 2024 07:59), Max: 98.6 (21 Aug 2024 21:10)  HR: 70 (22 Aug 2024 07:59) (70 - 79)  BP: 138/98 (22 Aug 2024 07:59) (132/75 - 138/98)  BP(mean): --  RR: 18 (22 Aug 2024 07:59) (18 - 18)  SpO2: 94% (22 Aug 2024 07:59) (94% - 97%)    Parameters below as of 22 Aug 2024 07:59  Patient On (Oxygen Delivery Method): room air        I&O's Summary    I&O's Detail      Physical Exam  Gen: NAD, comfortable in bed  Neuro: A&Ox3  Lungs: CTAB  CV: S1/S2, RRR  Abd: Soft, ND, upper abd tenderness w/ deep palpation   Extremities: Venodynes in place. No LE edema bl                          13.9   10.94 )-----------( 118      ( 22 Aug 2024 06:03 )             40.6       08-22    139  |  105  |  10  ----------------------------<  104<H>  3.4<L>   |  30  |  1.09    Ca    8.3<L>      22 Aug 2024 06:03    TPro  6.3  /  Alb  2.8<L>  /  TBili  2.2<H>  /  DBili  0.5<H>  /  AST  31  /  ALT  173<H>  /  AlkPhos  99  08-22

## 2024-08-23 LAB
ALBUMIN SERPL ELPH-MCNC: 2.7 G/DL — LOW (ref 3.3–5)
ALP SERPL-CCNC: 91 U/L — SIGNIFICANT CHANGE UP (ref 40–120)
ALT FLD-CCNC: 127 U/L — HIGH (ref 12–78)
ANION GAP SERPL CALC-SCNC: 5 MMOL/L — SIGNIFICANT CHANGE UP (ref 5–17)
AST SERPL-CCNC: 22 U/L — SIGNIFICANT CHANGE UP (ref 15–37)
BASOPHILS # BLD AUTO: 0.02 K/UL — SIGNIFICANT CHANGE UP (ref 0–0.2)
BASOPHILS NFR BLD AUTO: 0.3 % — SIGNIFICANT CHANGE UP (ref 0–2)
BILIRUB SERPL-MCNC: 1.7 MG/DL — HIGH (ref 0.2–1.2)
BLD GP AB SCN SERPL QL: SIGNIFICANT CHANGE UP
BUN SERPL-MCNC: 7 MG/DL — SIGNIFICANT CHANGE UP (ref 7–23)
CALCIUM SERPL-MCNC: 8.8 MG/DL — SIGNIFICANT CHANGE UP (ref 8.5–10.1)
CHLORIDE SERPL-SCNC: 106 MMOL/L — SIGNIFICANT CHANGE UP (ref 96–108)
CO2 SERPL-SCNC: 29 MMOL/L — SIGNIFICANT CHANGE UP (ref 22–31)
CREAT SERPL-MCNC: 0.87 MG/DL — SIGNIFICANT CHANGE UP (ref 0.5–1.3)
EGFR: 109 ML/MIN/1.73M2 — SIGNIFICANT CHANGE UP
EOSINOPHIL # BLD AUTO: 0.2 K/UL — SIGNIFICANT CHANGE UP (ref 0–0.5)
EOSINOPHIL NFR BLD AUTO: 2.6 % — SIGNIFICANT CHANGE UP (ref 0–6)
GLUCOSE SERPL-MCNC: 107 MG/DL — HIGH (ref 70–99)
HCT VFR BLD CALC: 40.6 % — SIGNIFICANT CHANGE UP (ref 39–50)
HGB BLD-MCNC: 13.8 G/DL — SIGNIFICANT CHANGE UP (ref 13–17)
IMM GRANULOCYTES NFR BLD AUTO: 0.3 % — SIGNIFICANT CHANGE UP (ref 0–0.9)
INR BLD: 1.13 RATIO — SIGNIFICANT CHANGE UP (ref 0.85–1.18)
LIDOCAIN IGE QN: 65 U/L — SIGNIFICANT CHANGE UP (ref 13–75)
LYMPHOCYTES # BLD AUTO: 1.33 K/UL — SIGNIFICANT CHANGE UP (ref 1–3.3)
LYMPHOCYTES # BLD AUTO: 17 % — SIGNIFICANT CHANGE UP (ref 13–44)
MCHC RBC-ENTMCNC: 28.5 PG — SIGNIFICANT CHANGE UP (ref 27–34)
MCHC RBC-ENTMCNC: 34 GM/DL — SIGNIFICANT CHANGE UP (ref 32–36)
MCV RBC AUTO: 83.7 FL — SIGNIFICANT CHANGE UP (ref 80–100)
MONOCYTES # BLD AUTO: 0.7 K/UL — SIGNIFICANT CHANGE UP (ref 0–0.9)
MONOCYTES NFR BLD AUTO: 9 % — SIGNIFICANT CHANGE UP (ref 2–14)
NEUTROPHILS # BLD AUTO: 5.55 K/UL — SIGNIFICANT CHANGE UP (ref 1.8–7.4)
NEUTROPHILS NFR BLD AUTO: 70.8 % — SIGNIFICANT CHANGE UP (ref 43–77)
PLATELET # BLD AUTO: 125 K/UL — LOW (ref 150–400)
POTASSIUM SERPL-MCNC: 3.8 MMOL/L — SIGNIFICANT CHANGE UP (ref 3.5–5.3)
POTASSIUM SERPL-SCNC: 3.8 MMOL/L — SIGNIFICANT CHANGE UP (ref 3.5–5.3)
PROT SERPL-MCNC: 6.5 GM/DL — SIGNIFICANT CHANGE UP (ref 6–8.3)
PROTHROM AB SERPL-ACNC: 12.7 SEC — SIGNIFICANT CHANGE UP (ref 9.5–13)
RBC # BLD: 4.85 M/UL — SIGNIFICANT CHANGE UP (ref 4.2–5.8)
RBC # FLD: 13.2 % — SIGNIFICANT CHANGE UP (ref 10.3–14.5)
SODIUM SERPL-SCNC: 140 MMOL/L — SIGNIFICANT CHANGE UP (ref 135–145)
WBC # BLD: 7.82 K/UL — SIGNIFICANT CHANGE UP (ref 3.8–10.5)
WBC # FLD AUTO: 7.82 K/UL — SIGNIFICANT CHANGE UP (ref 3.8–10.5)

## 2024-08-23 PROCEDURE — 99232 SBSQ HOSP IP/OBS MODERATE 35: CPT

## 2024-08-23 RX ADMIN — Medication 1 MILLIGRAM(S): at 09:55

## 2024-08-23 RX ADMIN — PIPERACILLIN SODIUM AND TAZOBACTAM SODIUM 25 GRAM(S): 3; .375 INJECTION, POWDER, FOR SOLUTION INTRAVENOUS at 21:36

## 2024-08-23 RX ADMIN — Medication 40 MILLIGRAM(S): at 06:31

## 2024-08-23 RX ADMIN — PIPERACILLIN SODIUM AND TAZOBACTAM SODIUM 25 GRAM(S): 3; .375 INJECTION, POWDER, FOR SOLUTION INTRAVENOUS at 05:57

## 2024-08-23 RX ADMIN — Medication 100 MILLIGRAM(S): at 09:55

## 2024-08-23 RX ADMIN — Medication 1 TABLET(S): at 09:55

## 2024-08-23 RX ADMIN — PIPERACILLIN SODIUM AND TAZOBACTAM SODIUM 25 GRAM(S): 3; .375 INJECTION, POWDER, FOR SOLUTION INTRAVENOUS at 14:51

## 2024-08-23 RX ADMIN — LOSARTAN POTASSIUM 25 MILLIGRAM(S): 50 TABLET ORAL at 09:55

## 2024-08-23 NOTE — CONSULT NOTE ADULT - ASSESSMENT
ASSESSMENT & PLAN: 43 year old male with gallstone pancreatitis planned for OR on Monday. ASSESSMENT & PLAN: 43 year old male with gallstone pancreatitis planned for OR on Monday. Patient noted to have sinus bradycardia and occasional Mobitz I, likely vagally mediated on tele.      Mobitz I likely vagally mediated due to pain, nausea  Patient without any symptomatic bradycardia or high degree AV block  Normal LV function on echo  Pain control  No EP intervention indicated at this time  Please reconsult as needed  Plan discussed with patient, Dr. Washington

## 2024-08-23 NOTE — PROGRESS NOTE ADULT - ASSESSMENT
44 y/o male with a PMHx of gastritis, perianal fistula, HTN on current dietary /lifestyle modification who presented to   [facility]  ED c/o vomiting and abd pain since yesterday.   Pt reports he ate around 11am yesterday and since then he has had abd pain and vomiting.  Pt denies fevers.   He denies cpain/SOB,  no urinary or resp complaints,  no edema/calf pain.  Pt reports he had some symptoms of abd pain 3 weeks ago and was advised by his provider to have imaging.  admitted to the hospitalist service for gallstone pancreatitis.     Gallstone pancreatitis   GI and surgery consulted, recommendations appreciated.   S/P ERCP 8/20 ,  with sphincterotomy, stone removal, stent placement  Plan for Lap Tonia On monday 8/26  Continue Clear liquid Diet.   Continue IV Zosyn   Continue pain control and supportive care    Thrombocytopenia  Continue to monitor     Hypertension  Bradycardia on ED telemetry HR 40's - 50's,  asymptomatic   Episodes of Mobitz type 1 Heart Block  DC Amlodipine , start losartan  EP consulted recommendations appreciated   HR in 70s this AM   Repeat EKG     Hx of gastritis  Continue Protonix        - DVT proph: SCDS,  ambulation.   - Full Code

## 2024-08-23 NOTE — CONSULT NOTE ADULT - SUBJECTIVE AND OBJECTIVE BOX
HPI:  Pt is a pleasant 44 y/o male with a PMHx of gastritis, perianal fistula, HTN on current dietary /lifestyle modification who presented to   [place]  ED c/o vomiting and abd pain since yesterday.   Pt reports he ate around 11am yesterday and since then he has had abd pain and vomiting.  Pt denies fevers.   He denies cpain/SOB,  no urinary or resp complaints,  no edema/calf pain.  Pt reports he had some symptoms of abd pain 3 weeks ago and was advised by his provider to have imaging.    He has a follow up with his PCP for high blood pressure in Sept 2024. (19 Aug 2024 23:44)    43y Male    PAST MEDICAL & SURGICAL HISTORY:  Gastritis      Perianal fistula      No significant past surgical history          MEDICATIONS  (STANDING):  folic acid 1 milliGRAM(s) Oral daily  losartan 25 milliGRAM(s) Oral daily  multivitamin 1 Tablet(s) Oral daily  pantoprazole    Tablet 40 milliGRAM(s) Oral before breakfast  piperacillin/tazobactam IVPB.. 3.375 Gram(s) IV Intermittent every 8 hours  sodium chloride 0.9%. 2000 milliLiter(s) (175 mL/Hr) IV Continuous <Continuous>  thiamine 100 milliGRAM(s) Oral daily    MEDICATIONS  (PRN):  morphine  - Injectable 4 milliGRAM(s) IV Push every 4 hours PRN Severe Pain (7 - 10)  ondansetron Injectable 4 milliGRAM(s) IV Push every 6 hours PRN Nausea and/or Vomiting      Allergies    No Known Allergies    Intolerances        SOCIAL HISTORY: Denies tobacco, etoh abuse or illicit drug use    FAMILY HISTORY:  No pertinent family history in first degree relatives        Vital Signs Last 24 Hrs  T(C): 36.7 (23 Aug 2024 08:17), Max: 36.9 (22 Aug 2024 20:16)  T(F): 98.1 (23 Aug 2024 08:17), Max: 98.4 (22 Aug 2024 20:16)  HR: 69 (23 Aug 2024 08:17) (62 - 69)  BP: 133/94 (23 Aug 2024 08:17) (131/86 - 133/94)  BP(mean): --  RR: 18 (23 Aug 2024 08:17) (18 - 18)  SpO2: 97% (23 Aug 2024 08:17) (97% - 100%)    Parameters below as of 23 Aug 2024 08:17  Patient On (Oxygen Delivery Method): room air        REVIEW OF SYSTEMS:    CONSTITUTIONAL:  As per HPI.  HEENT:  Eyes:  No diplopia or blurred vision. ENT:  No earache, sore throat or runny nose.  CARDIOVASCULAR:  No pressure, squeezing, strangling, tightness, heaviness or aching about the chest, neck, axilla or epigastrium.  RESPIRATORY:  No cough, shortness of breath, PND or orthopnea.  GASTROINTESTINAL:  No nausea, vomiting or diarrhea.  GENITOURINARY:  No dysuria, frequency or urgency.  MUSCULOSKELETAL:  As per HPI.  SKIN:  No change in skin, hair or nails.  NEUROLOGIC:  No paresthesias, fasciculations, seizures or weakness.  PSYCHIATRIC:  No disorder of thought or mood.  ENDOCRINE:  No heat or cold intolerance, polyuria or polydipsia.  HEMATOLOGICAL:  No easy bruising or bleedings:  .     PHYSICAL EXAMINATION:    GENERAL APPEARANCE:  Pt. is not currently dyspneic, in no distress. Pt. is alert, oriented, and pleasant.  HEENT:  Pupils are normal and react normally. No icterus. Mucous membranes well colored.  NECK:  Supple. No lymphadenopathy. Jugular venous pressure not elevated. Carotids equal.   HEART:   The cardiac impulse has a normal quality. There are no murmurs, rubs or gallops noted  CHEST:  Chest is clear to auscultation. Normal respiratory effort.  ABDOMEN:  Soft and nontender.   EXTREMITIES:  There is no edema.   SKIN:  No rash or significant lesions are noted.    I&O's Summary      LABS:                        13.8   7.82  )-----------( 125      ( 23 Aug 2024 05:06 )             40.6     08-23    140  |  106  |  7   ----------------------------<  107<H>  3.8   |  29  |  0.87    Ca    8.8      23 Aug 2024 05:06    TPro  6.5  /  Alb  2.7<L>  /  TBili  1.7<H>  /  DBili  x   /  AST  22  /  ALT  127<H>  /  AlkPhos  91  08-23    LIVER FUNCTIONS - ( 23 Aug 2024 05:06 )  Alb: 2.7 g/dL / Pro: 6.5 gm/dL / ALK PHOS: 91 U/L / ALT: 127 U/L / AST: 22 U/L / GGT: x           PT/INR - ( 23 Aug 2024 05:06 )   PT: 12.7 sec;   INR: 1.13 ratio               Urinalysis Basic - ( 23 Aug 2024 05:06 )    Color: x / Appearance: x / SG: x / pH: x  Gluc: 107 mg/dL / Ketone: x  / Bili: x / Urobili: x   Blood: x / Protein: x / Nitrite: x   Leuk Esterase: x / RBC: x / WBC x   Sq Epi: x / Non Sq Epi: x / Bacteria: x          EKG:    TELEMETRY:    CARDIAC TESTS:    RADIOLOGY & ADDITIONAL STUDIES:    ASSESSMENT & PLAN: HPI:  Pt is a pleasant 44 y/o male with a PMHx of gastritis, perianal fistula, HTN on current dietary /lifestyle modification who presented to   [place]  ED c/o vomiting and abd pain since yesterday.   Pt reports he ate around 11am yesterday and since then he has had abd pain and vomiting.  Pt denies fevers.   He denies cpain/SOB,  no urinary or resp complaints,  no edema/calf pain. Pt reports he had some symptoms of abd pain 3 weeks ago and was advised by his provider to have imaging.  He has a follow up with his PCP for high blood pressure in Sept 2024.   Patient with gallstone pancreatitis, planned for OR on Monday.  Patient noted to have SB and occ Wenkebach on telemetry.  EP asked to evaluate for bradycardia/Mobitz I.        PAST MEDICAL & SURGICAL HISTORY:  Gastritis  Perianal fistula  No significant past surgical history          MEDICATIONS  (STANDING):  folic acid 1 milliGRAM(s) Oral daily  losartan 25 milliGRAM(s) Oral daily  multivitamin 1 Tablet(s) Oral daily  pantoprazole    Tablet 40 milliGRAM(s) Oral before breakfast  piperacillin/tazobactam IVPB.. 3.375 Gram(s) IV Intermittent every 8 hours  sodium chloride 0.9%. 2000 milliLiter(s) (175 mL/Hr) IV Continuous <Continuous>  thiamine 100 milliGRAM(s) Oral daily    MEDICATIONS  (PRN):  morphine  - Injectable 4 milliGRAM(s) IV Push every 4 hours PRN Severe Pain (7 - 10)  ondansetron Injectable 4 milliGRAM(s) IV Push every 6 hours PRN Nausea and/or Vomiting      Allergies    No Known Allergies    SOCIAL HISTORY: Denies tobacco, etoh abuse or illicit drug use    FAMILY HISTORY:  No pertinent family history in first degree relatives        Vital Signs Last 24 Hrs  T(C): 36.7 (23 Aug 2024 08:17), Max: 36.9 (22 Aug 2024 20:16)  T(F): 98.1 (23 Aug 2024 08:17), Max: 98.4 (22 Aug 2024 20:16)  HR: 69 (23 Aug 2024 08:17) (62 - 69)  BP: 133/94 (23 Aug 2024 08:17) (131/86 - 133/94)  RR: 18 (23 Aug 2024 08:17) (18 - 18)  SpO2: 97% (23 Aug 2024 08:17) (97% - 100%)    Parameters below as of 23 Aug 2024 08:17  Patient On (Oxygen Delivery Method): room air        REVIEW OF SYSTEMS:    CONSTITUTIONAL:  As per HPI.  HEENT:  Eyes:  No diplopia or blurred vision. ENT:  No earache, sore throat or runny nose.  CARDIOVASCULAR:  No pressure, squeezing, strangling, tightness, heaviness or aching about the chest, neck, axilla or epigastrium.  RESPIRATORY:  No cough, shortness of breath, PND or orthopnea.  GASTROINTESTINAL:  No nausea, vomiting or diarrhea.  GENITOURINARY:  No dysuria, frequency or urgency.  MUSCULOSKELETAL:  As per HPI.  SKIN:  No change in skin, hair or nails.  NEUROLOGIC:  No paresthesias, fasciculations, seizures or weakness.  PSYCHIATRIC:  No disorder of thought or mood.  ENDOCRINE:  No heat or cold intolerance, polyuria or polydipsia.  HEMATOLOGICAL:  No easy bruising or bleedings:  .     PHYSICAL EXAMINATION:    GENERAL APPEARANCE:  Pt. is not currently dyspneic, in no distress. Pt. is alert, oriented, and pleasant.  HEENT:  Pupils are normal and react normally. No icterus. Mucous membranes well colored.  NECK:  Supple. No lymphadenopathy. Jugular venous pressure not elevated. Carotids equal.   HEART:   The cardiac impulse has a normal quality. There are no murmurs, rubs or gallops noted  CHEST:  Chest is clear to auscultation. Normal respiratory effort.  ABDOMEN:  Soft and nontender.   EXTREMITIES:  There is no edema.   SKIN:  No rash or significant lesions are noted.    I&O's Summary      LABS:                        13.8   7.82  )-----------( 125      ( 23 Aug 2024 05:06 )             40.6     08-23    140  |  106  |  7   ----------------------------<  107<H>  3.8   |  29  |  0.87    Ca    8.8      23 Aug 2024 05:06    TPro  6.5  /  Alb  2.7<L>  /  TBili  1.7<H>  /  DBili  x   /  AST  22  /  ALT  127<H>  /  AlkPhos  91  08-23    LIVER FUNCTIONS - ( 23 Aug 2024 05:06 )  Alb: 2.7 g/dL / Pro: 6.5 gm/dL / ALK PHOS: 91 U/L / ALT: 127 U/L / AST: 22 U/L / GGT: x           PT/INR - ( 23 Aug 2024 05:06 )   PT: 12.7 sec;   INR: 1.13 ratio         Urinalysis Basic - ( 23 Aug 2024 05:06 )    Color: x / Appearance: x / SG: x / pH: x  Gluc: 107 mg/dL / Ketone: x  / Bili: x / Urobili: x   Blood: x / Protein: x / Nitrite: x   Leuk Esterase: x / RBC: x / WBC x   Sq Epi: x / Non Sq Epi: x / Bacteria: x      EKG: NSR@64bpm  FL: 192ms  QRS: 96ms  QT/QTc: 396/408    TELEMETRY: SR/SB, episodes of Wenkebach    CARDIAC TESTS:  Pt. Name:       JOSS VELAZQUEZ Study Date:    8/21/2024  MRN:            [id number]    YOB: 1980  Accession #:    [id number]    Age:           43 years  Account#:       [id number] Gender:        M  Heart Rate:                  Height:        67.00 in (170.18 cm)  Rhythm:                      Weight:        180.00 lb (81.65 kg)  Blood Pressure: 136/87 mmHg  BSA/BMI:       1.93 m² / 28.19 kg/m²  ________________________________________________________________________________________  Referring Physician:    [id number] Wilmer Wahl  Interpreting Physician: Colby Rose MD  Primary Sonographer:    Ernestina Ash [facility]    CPT:               3D RECONST W/O WKSTATION - 91077.m;ECHO TTE WO CON COMP W                     DOPP - 52590.m  Indication(s):     Encounter for preprocedural cardiovascular examination -                     Z01.810  Procedure:         Transthoracic echocardiogram with 2-D, M-mode and complete                     spectral and color flow Doppler. Real time and full volume                     3-dimensional imaging performed at the echo machine.  Ordering Location: 3E  Admission Status:  Inpatient  Study Information: Image quality for this study is good.    _______________________________________________________________________________________     CONCLUSIONS:      1. Left ventricular systolic function is normal with an ejection fraction of 61 % by 3D.   2. Normal left ventricular diastolic function.   3. Normal right ventricular systolic function.   4. No pericardial effusion seen.      RADIOLOGY & ADDITIONAL STUDIES:

## 2024-08-23 NOTE — PROGRESS NOTE ADULT - SUBJECTIVE AND OBJECTIVE BOX
44 y/o male with a PMHx of gastritis, perianal fistula, HTN on current dietary /lifestyle modification who presented to   [place]  ED c/o vomiting and abd pain since yesterday.   Pt reports he ate around 11am yesterday and since then he has had abd pain and vomiting.  Pt denies fevers.   He denies cpain/SOB,  no urinary or resp complaints,  no edema/calf pain.  Pt reports he had some symptoms of abd pain 3 weeks ago and was advised by his provider to have imaging.  He has a follow up with his PCP for high blood pressure in Sept 2024. Admitted to the hospitalist service for gallstone pancreatitis.       Subjective: Patient seen and examined. S/P ERCP 8/20. resting comfortably. Abdominal pain continues to improve. repeat Lipase 127 this AM . mild irritation with clear liquids but tolerable. Plan for lap carine monday.     Additional results/Imaging, I have personally reviewed:All other systems reviewed and found to be negative with the exception of what has been described above.PHYSICAL EXAM:  Gen: No acute distress   HEENT:  pupils equal and reactive, EOMI,   NECK:   supple, no carotid bruits, No JVD  CV:  +S1, +S2, regular rate rhythm, no murmurs or rubs  RESP:   lungs clear to auscultation bilaterally, no wheezing, rales, rhonchi, good air entry bilaterally  GI:  abdomen soft, non-distended, normal BS, Tender to palpation upper abdomen bilaterally.   MSK:   normal muscle tone, no atrophy, no rigidity, no contractions  EXT:  no clubbing, no cyanosis, no edema, no calf pain, swelling or erythema  VASCULAR:  pulses equal and symmetric in the upper and lower extremities  NEURO:  AAOX3, no focal neurological deficits, follows all commands, able to move extremities spontaneously  SKIN:  no ulcers, lesions or rashes      labs reviewd

## 2024-08-23 NOTE — PROGRESS NOTE ADULT - ASSESSMENT
42 yo male with biliary pancreatitis, still having pain.  -isrrael clear diet  -Serial abd exams  -Possible OR on Monday for robotic carine    D/W Dr. Romo

## 2024-08-23 NOTE — PROGRESS NOTE ADULT - SUBJECTIVE AND OBJECTIVE BOX
Pt still complaining of diffuse abdominal pain.  No n/v.  Some pain after clears.    Vital Signs Last 24 Hrs  T(C): 36.7 (23 Aug 2024 08:17), Max: 36.9 (22 Aug 2024 20:16)  T(F): 98.1 (23 Aug 2024 08:17), Max: 98.4 (22 Aug 2024 20:16)  HR: 69 (23 Aug 2024 08:17) (62 - 69)  BP: 133/94 (23 Aug 2024 08:17) (131/86 - 133/94)  BP(mean): --  RR: 18 (23 Aug 2024 08:17) (18 - 18)  SpO2: 97% (23 Aug 2024 08:17) (97% - 100%)    Parameters below as of 23 Aug 2024 08:17  Patient On (Oxygen Delivery Method): room air    Exam:  Gen- appears well  CV- RRR   Chest- CTA bilat  Abd- soft, mildly tender RUQ, & periumbilical, ND, hypoactive bs  Ext- no edema    08-23    140  |  106  |  7   ----------------------------<  107<H>  3.8   |  29  |  0.87    Ca    8.8      23 Aug 2024 05:06    TPro  6.5  /  Alb  2.7<L>  /  TBili  1.7<H>  /  DBili  x   /  AST  22  /  ALT  127<H>  /  AlkPhos  91  08-23                          13.8   7.82  )-----------( 125      ( 23 Aug 2024 05:06 )             40.6         LIVER FUNCTIONS - ( 23 Aug 2024 05:06 )  Alb: 2.7 g/dL / Pro: 6.5 gm/dL / ALK PHOS: 91 U/L / ALT: 127 U/L / AST: 22 U/L / GGT: x           PT/INR - ( 23 Aug 2024 05:06 )   PT: 12.7 sec;   INR: 1.13 ratio           Urinalysis Basic - ( 23 Aug 2024 05:06 )    Color: x / Appearance: x / SG: x / pH: x  Gluc: 107 mg/dL / Ketone: x  / Bili: x / Urobili: x   Blood: x / Protein: x / Nitrite: x   Leuk Esterase: x / RBC: x / WBC x   Sq Epi: x / Non Sq Epi: x / Bacteria: x

## 2024-08-24 LAB
ALBUMIN SERPL ELPH-MCNC: 2.9 G/DL — LOW (ref 3.3–5)
ALP SERPL-CCNC: 90 U/L — SIGNIFICANT CHANGE UP (ref 40–120)
ALT FLD-CCNC: 125 U/L — HIGH (ref 12–78)
ANION GAP SERPL CALC-SCNC: 6 MMOL/L — SIGNIFICANT CHANGE UP (ref 5–17)
AST SERPL-CCNC: 31 U/L — SIGNIFICANT CHANGE UP (ref 15–37)
BASOPHILS # BLD AUTO: 0.03 K/UL — SIGNIFICANT CHANGE UP (ref 0–0.2)
BASOPHILS NFR BLD AUTO: 0.5 % — SIGNIFICANT CHANGE UP (ref 0–2)
BILIRUB SERPL-MCNC: 1.3 MG/DL — HIGH (ref 0.2–1.2)
BUN SERPL-MCNC: 8 MG/DL — SIGNIFICANT CHANGE UP (ref 7–23)
CALCIUM SERPL-MCNC: 9.1 MG/DL — SIGNIFICANT CHANGE UP (ref 8.5–10.1)
CHLORIDE SERPL-SCNC: 108 MMOL/L — SIGNIFICANT CHANGE UP (ref 96–108)
CO2 SERPL-SCNC: 26 MMOL/L — SIGNIFICANT CHANGE UP (ref 22–31)
CREAT SERPL-MCNC: 0.89 MG/DL — SIGNIFICANT CHANGE UP (ref 0.5–1.3)
EGFR: 108 ML/MIN/1.73M2 — SIGNIFICANT CHANGE UP
EOSINOPHIL # BLD AUTO: 0.25 K/UL — SIGNIFICANT CHANGE UP (ref 0–0.5)
EOSINOPHIL NFR BLD AUTO: 4.4 % — SIGNIFICANT CHANGE UP (ref 0–6)
GLUCOSE SERPL-MCNC: 106 MG/DL — HIGH (ref 70–99)
HCT VFR BLD CALC: 43.5 % — SIGNIFICANT CHANGE UP (ref 39–50)
HGB BLD-MCNC: 14.9 G/DL — SIGNIFICANT CHANGE UP (ref 13–17)
IMM GRANULOCYTES NFR BLD AUTO: 0.2 % — SIGNIFICANT CHANGE UP (ref 0–0.9)
LYMPHOCYTES # BLD AUTO: 1.12 K/UL — SIGNIFICANT CHANGE UP (ref 1–3.3)
LYMPHOCYTES # BLD AUTO: 19.7 % — SIGNIFICANT CHANGE UP (ref 13–44)
MCHC RBC-ENTMCNC: 28.7 PG — SIGNIFICANT CHANGE UP (ref 27–34)
MCHC RBC-ENTMCNC: 34.3 GM/DL — SIGNIFICANT CHANGE UP (ref 32–36)
MCV RBC AUTO: 83.8 FL — SIGNIFICANT CHANGE UP (ref 80–100)
MONOCYTES # BLD AUTO: 0.41 K/UL — SIGNIFICANT CHANGE UP (ref 0–0.9)
MONOCYTES NFR BLD AUTO: 7.2 % — SIGNIFICANT CHANGE UP (ref 2–14)
NEUTROPHILS # BLD AUTO: 3.87 K/UL — SIGNIFICANT CHANGE UP (ref 1.8–7.4)
NEUTROPHILS NFR BLD AUTO: 68 % — SIGNIFICANT CHANGE UP (ref 43–77)
PLATELET # BLD AUTO: 169 K/UL — SIGNIFICANT CHANGE UP (ref 150–400)
POTASSIUM SERPL-MCNC: 3.9 MMOL/L — SIGNIFICANT CHANGE UP (ref 3.5–5.3)
POTASSIUM SERPL-SCNC: 3.9 MMOL/L — SIGNIFICANT CHANGE UP (ref 3.5–5.3)
PROT SERPL-MCNC: 7.1 GM/DL — SIGNIFICANT CHANGE UP (ref 6–8.3)
RBC # BLD: 5.19 M/UL — SIGNIFICANT CHANGE UP (ref 4.2–5.8)
RBC # FLD: 13 % — SIGNIFICANT CHANGE UP (ref 10.3–14.5)
SODIUM SERPL-SCNC: 140 MMOL/L — SIGNIFICANT CHANGE UP (ref 135–145)
WBC # BLD: 5.69 K/UL — SIGNIFICANT CHANGE UP (ref 3.8–10.5)
WBC # FLD AUTO: 5.69 K/UL — SIGNIFICANT CHANGE UP (ref 3.8–10.5)

## 2024-08-24 PROCEDURE — 99232 SBSQ HOSP IP/OBS MODERATE 35: CPT

## 2024-08-24 RX ADMIN — PIPERACILLIN SODIUM AND TAZOBACTAM SODIUM 25 GRAM(S): 3; .375 INJECTION, POWDER, FOR SOLUTION INTRAVENOUS at 21:21

## 2024-08-24 RX ADMIN — LOSARTAN POTASSIUM 25 MILLIGRAM(S): 50 TABLET ORAL at 09:04

## 2024-08-24 RX ADMIN — Medication 1 TABLET(S): at 09:04

## 2024-08-24 RX ADMIN — Medication 40 MILLIGRAM(S): at 06:05

## 2024-08-24 RX ADMIN — Medication 100 MILLIGRAM(S): at 09:04

## 2024-08-24 RX ADMIN — PIPERACILLIN SODIUM AND TAZOBACTAM SODIUM 25 GRAM(S): 3; .375 INJECTION, POWDER, FOR SOLUTION INTRAVENOUS at 13:34

## 2024-08-24 RX ADMIN — Medication 1 MILLIGRAM(S): at 09:04

## 2024-08-24 RX ADMIN — PIPERACILLIN SODIUM AND TAZOBACTAM SODIUM 25 GRAM(S): 3; .375 INJECTION, POWDER, FOR SOLUTION INTRAVENOUS at 06:00

## 2024-08-24 NOTE — PROGRESS NOTE ADULT - SUBJECTIVE AND OBJECTIVE BOX
42 y/o male with a PMHx of gastritis, perianal fistula, HTN on current dietary /lifestyle modification who presented to   [place]  ED c/o vomiting and abd pain since yesterday.   Pt reports he ate around 11am yesterday and since then he has had abd pain and vomiting.  Pt denies fevers.   He denies cpain/SOB,  no urinary or resp complaints,  no edema/calf pain.  Pt reports he had some symptoms of abd pain 3 weeks ago and was advised by his provider to have imaging.  He has a follow up with his PCP for high blood pressure in Sept 2024. Admitted to the hospitalist service for gallstone pancreatitis.       Subjective:afebrile, has some abd pain with food, surg advanced diet to low fat  Additional results/Imaging, I have personally reviewed:All other systems reviewed and found to be negative with the exception of what has been described above      .PHYSICAL EXAM:  Gen: No acute distress   HEENT:  pupils equal and reactive, EOMI,   NECK:   supple, no carotid bruits, No JVD  CV:  +S1, +S2, regular rate rhythm, no murmurs or rubs  RESP:   lungs clear to auscultation bilaterally, no wheezing, rales, rhonchi, good air entry bilaterally  GI:  abdomen soft, non-distended, normal BS, Tender to palpation upper abdomen bilaterally.   MSK:   normal muscle tone, no atrophy, no rigidity, no contractions  EXT:  no clubbing, no cyanosis, no edema, no calf pain, swelling or erythema  VASCULAR:  pulses equal and symmetric in the upper and lower extremities  NEURO:  AAOX3, no focal neurological deficits, follows all commands, able to move extremities spontaneously  SKIN:  no ulcers, lesions or rashes    labs reviewd

## 2024-08-24 NOTE — PROGRESS NOTE ADULT - ASSESSMENT
44 y/o male with a PMHx of gastritis, perianal fistula, HTN on current dietary /lifestyle modification who presented to   [place]  ED c/o vomiting and abd pain since yesterday.   Pt reports he ate around 11am yesterday and since then he has had abd pain and vomiting.  Pt denies fevers.   He denies cpain/SOB,  no urinary or resp complaints,  no edema/calf pain.  Pt reports he had some symptoms of abd pain 3 weeks ago and was advised by his provider to have imaging.  admitted to the hospitalist service for gallstone pancreatitis.     Gallstone pancreatitis   GI and surgery consulted, recommendations appreciated.   S/P ERCP 8/20 ,  with sphincterotomy, stone removal, stent placement  Plan for Lap Carine On monday 8/26  Continue Clear liquid Diet.   Continue IV Zosyn   Continue pain control and supportive care  Thrombocytopenia  Continue to monitor     Hypertension  Bradycardia on ED telemetry HR 40's - 50's,  asymptomatic   Episodes of Mobitz type 1 Heart Block  DC Amlodipine , start losartan  EP consulted recommendations appreciated   HR in 70s this AM   Repeat EKG     Hx of gastritis  Continue Protonix        - DVT proph: SCDS,  ambulation.   - Full Code      plan for OR lap carine monday

## 2024-08-24 NOTE — PROGRESS NOTE ADULT - ASSESSMENT
42 yo male with biliary pancreatitis,     s/p ERCP 8/20  LFTs trending down    - c/w diet  -Serial abd exams  -Possible OR on Monday for robotic carine  - preop sunday  - trend LFTs    D/W Dr. Romo

## 2024-08-24 NOTE — PROGRESS NOTE ADULT - SUBJECTIVE AND OBJECTIVE BOX
Pt seen at bedside, resting comfortable  mild epigastric discomfort with meals  Pt denies nausea, vomiting, fever, chills      Vitals:  T(C): 36.3 (08-23 @ 20:35), Max: 36.7 (08-23 @ 08:17)  HR: 58 (08-23 @ 20:35) (58 - 69)  BP: 130/87 (08-23 @ 20:35) (130/87 - 133/94)  RR: 17 (08-23 @ 20:35) (17 - 18)  SpO2: 99% (08-23 @ 20:35) (97% - 99%)      Physical Exam:  General: AAOx3, NAD  Chest: Normal respiratory effort  Heart: RRR  Abdomen: Soft, ND, mild tender in the RUQ  Neuro/Psych: No localized deficits. Normal speech, normal tone  Skin: Normal, no rashes, no lesions noted.   Extremities: Warm, well perfused, no edema,    08-23 @ 05:06                    13.8  CBC: 7.82>)-------(<125                     40.6                 140 | 106 | 7    CMP:  ----------------------< 107               3.8 | 29 | 0.87                      Ca:8.8  Phos:-  Mg:-               1.7|      |22        LFTs:  ------|91|-----             -|      |-      Culture - Blood (collected 08-19-24 @ 18:22)  Source: Blood None  Preliminary Report (08-24-24 @ 01:01):    No growth at 4 days    Culture - Blood (collected 08-19-24 @ 18:22)  Source: Blood None  Preliminary Report (08-24-24 @ 01:01):    No growth at 4 days      Current Inpatient Medications:  folic acid 1 milliGRAM(s) Oral daily  losartan 25 milliGRAM(s) Oral daily  morphine  - Injectable 4 milliGRAM(s) IV Push every 4 hours PRN  multivitamin 1 Tablet(s) Oral daily  ondansetron Injectable 4 milliGRAM(s) IV Push every 6 hours PRN  pantoprazole    Tablet 40 milliGRAM(s) Oral before breakfast  piperacillin/tazobactam IVPB.. 3.375 Gram(s) IV Intermittent every 8 hours  sodium chloride 0.9%. 2000 milliLiter(s) (175 mL/Hr) IV Continuous <Continuous>  thiamine 100 milliGRAM(s) Oral daily

## 2024-08-25 LAB
ALBUMIN SERPL ELPH-MCNC: 3.2 G/DL — LOW (ref 3.3–5)
ALP SERPL-CCNC: 95 U/L — SIGNIFICANT CHANGE UP (ref 40–120)
ALT FLD-CCNC: 123 U/L — HIGH (ref 12–78)
ANION GAP SERPL CALC-SCNC: 4 MMOL/L — LOW (ref 5–17)
AST SERPL-CCNC: 30 U/L — SIGNIFICANT CHANGE UP (ref 15–37)
BILIRUB SERPL-MCNC: 0.7 MG/DL — SIGNIFICANT CHANGE UP (ref 0.2–1.2)
BUN SERPL-MCNC: 15 MG/DL — SIGNIFICANT CHANGE UP (ref 7–23)
CALCIUM SERPL-MCNC: 9.3 MG/DL — SIGNIFICANT CHANGE UP (ref 8.5–10.1)
CHLORIDE SERPL-SCNC: 108 MMOL/L — SIGNIFICANT CHANGE UP (ref 96–108)
CO2 SERPL-SCNC: 28 MMOL/L — SIGNIFICANT CHANGE UP (ref 22–31)
CREAT SERPL-MCNC: 1.02 MG/DL — SIGNIFICANT CHANGE UP (ref 0.5–1.3)
CULTURE RESULTS: SIGNIFICANT CHANGE UP
CULTURE RESULTS: SIGNIFICANT CHANGE UP
EGFR: 93 ML/MIN/1.73M2 — SIGNIFICANT CHANGE UP
GLUCOSE SERPL-MCNC: 104 MG/DL — HIGH (ref 70–99)
POTASSIUM SERPL-MCNC: 4.5 MMOL/L — SIGNIFICANT CHANGE UP (ref 3.5–5.3)
POTASSIUM SERPL-SCNC: 4.5 MMOL/L — SIGNIFICANT CHANGE UP (ref 3.5–5.3)
PROT SERPL-MCNC: 7.5 GM/DL — SIGNIFICANT CHANGE UP (ref 6–8.3)
SODIUM SERPL-SCNC: 140 MMOL/L — SIGNIFICANT CHANGE UP (ref 135–145)
SPECIMEN SOURCE: SIGNIFICANT CHANGE UP
SPECIMEN SOURCE: SIGNIFICANT CHANGE UP

## 2024-08-25 PROCEDURE — 99232 SBSQ HOSP IP/OBS MODERATE 35: CPT

## 2024-08-25 RX ORDER — SODIUM CHLORIDE 9 MG/ML
1000 INJECTION INTRAMUSCULAR; INTRAVENOUS; SUBCUTANEOUS
Refills: 0 | Status: DISCONTINUED | OUTPATIENT
Start: 2024-08-26 | End: 2024-08-27

## 2024-08-25 RX ADMIN — Medication 100 MILLIGRAM(S): at 09:06

## 2024-08-25 RX ADMIN — Medication 1 TABLET(S): at 09:06

## 2024-08-25 RX ADMIN — PIPERACILLIN SODIUM AND TAZOBACTAM SODIUM 25 GRAM(S): 3; .375 INJECTION, POWDER, FOR SOLUTION INTRAVENOUS at 21:07

## 2024-08-25 RX ADMIN — PIPERACILLIN SODIUM AND TAZOBACTAM SODIUM 25 GRAM(S): 3; .375 INJECTION, POWDER, FOR SOLUTION INTRAVENOUS at 06:10

## 2024-08-25 RX ADMIN — LOSARTAN POTASSIUM 25 MILLIGRAM(S): 50 TABLET ORAL at 09:06

## 2024-08-25 RX ADMIN — Medication 1 MILLIGRAM(S): at 09:06

## 2024-08-25 RX ADMIN — PIPERACILLIN SODIUM AND TAZOBACTAM SODIUM 25 GRAM(S): 3; .375 INJECTION, POWDER, FOR SOLUTION INTRAVENOUS at 13:42

## 2024-08-25 RX ADMIN — Medication 40 MILLIGRAM(S): at 06:10

## 2024-08-25 NOTE — PROGRESS NOTE ADULT - ASSESSMENT
42 y/o male with a PMHx of gastritis, perianal fistula, HTN on current dietary /lifestyle modification who presented to   [place]  ED c/o vomiting and abd pain since yesterday.   Pt reports he ate around 11am yesterday and since then he has had abd pain and vomiting.  Pt denies fevers.   He denies cpain/SOB,  no urinary or resp complaints,  no edema/calf pain.  Pt reports he had some symptoms of abd pain 3 weeks ago and was advised by his provider to have imaging.  admitted to the hospitalist service for gallstone pancreatitis.     Gallstone pancreatitis   GI and surgery consulted, recommendations appreciated.   S/P ERCP 8/20 ,  with sphincterotomy, stone removal, stent placement  Plan for Lap Carine On monday 8/26  Continue Clear liquid Diet.   Continue IV Zosyn   Continue pain control and supportive care  ThrombocytopeniaContinue to monitor     Hypertension  Bradycardia on ED telemetry HR 40's - 50's,  asymptomatic   Episodes of Mobitz type 1 Heart Block  DC Amlodipine , start losartan  EP consulted recommendations appreciated   HR in 70s this AM       Hx of gastritis  Continue Protonix        - DVT proph: SCDS,  ambulation.   - Full Code      plan for OR lap carine monday    preop eval  s/p mobitz type 1 AV block , asymptomatic echo wnl , EKG and CXR reveiwed    good functional capacity >4   no acute contraindications for lap carine monday

## 2024-08-25 NOTE — PROGRESS NOTE ADULT - SUBJECTIVE AND OBJECTIVE BOX
44 y/o male with a PMHx of gastritis, perianal fistula, HTN on current dietary /lifestyle modification who presented to   [place]  ED c/o vomiting and abd pain since yesterday.   Pt reports he ate around 11am yesterday and since then he has had abd pain and vomiting.  Pt denies fevers.   He denies cpain/SOB,  no urinary or resp complaints,  no edema/calf pain.  Pt reports he had some symptoms of abd pain 3 weeks ago and was advised by his provider to have imaging.  He has a follow up with his PCP for high blood pressure in Sept 2024. Admitted to the hospitalist service for gallstone pancreatitis.       Subjective:afebrile, has some abd pain with food, surg advanced diet to low fat  Additional results/Imaging, I have personally reviewed:All other systems reviewed and found to be negative with the exception of what has been described above  .PHYSICAL EXAM:  Gen: No acute distress   HEENT:  pupils equal and reactive, EOMI,   NECK:   supple, no carotid bruits, No JVD  CV:  +S1, +S2, regular rate rhythm, no murmurs or rubs  RESP:   lungs clear to auscultation bilaterally, no wheezing, rales, rhonchi, good air entry bilaterally  GI:  abdomen soft, non-distended, normal BS, Tender to palpation upper abdomen bilaterally.   MSK:   normal muscle tone, no atrophy, no rigidity, no contractions  EXT:  no clubbing, no cyanosis, no edema, no calf pain, swelling or erythema  VASCULAR:  pulses equal and symmetric in the upper and lower extremities  NEURO:  AAOX3, no focal neurological deficits, follows all commands, able to move extremities spontaneously  SKIN:  no ulcers, lesions or rashes    labs reviewd

## 2024-08-25 NOTE — PROGRESS NOTE ADULT - SUBJECTIVE AND OBJECTIVE BOX
SURGERY DAILY PROGRESS NOTE:     Subjective:  Patient seen and examined at bedside during am rounds. c/o mild abdominal pain. Jennifer low fat diet.  AVSS. Denies any fevers, chills, n/v/d, chest pain or shortness of breath    Objective:    MEDICATIONS  (STANDING):  folic acid 1 milliGRAM(s) Oral daily  losartan 25 milliGRAM(s) Oral daily  multivitamin 1 Tablet(s) Oral daily  pantoprazole    Tablet 40 milliGRAM(s) Oral before breakfast  piperacillin/tazobactam IVPB.. 3.375 Gram(s) IV Intermittent every 8 hours  sodium chloride 0.9%. 2000 milliLiter(s) (175 mL/Hr) IV Continuous <Continuous>  thiamine 100 milliGRAM(s) Oral daily    MEDICATIONS  (PRN):  morphine  - Injectable 4 milliGRAM(s) IV Push every 4 hours PRN Severe Pain (7 - 10)  ondansetron Injectable 4 milliGRAM(s) IV Push every 6 hours PRN Nausea and/or Vomiting      Vital Signs Last 24 Hrs  T(C): 36.5 (24 Aug 2024 21:01), Max: 36.6 (24 Aug 2024 07:22)  T(F): 97.7 (24 Aug 2024 21:01), Max: 97.9 (24 Aug 2024 07:22)  HR: 69 (24 Aug 2024 21:01) (58 - 69)  BP: 132/78 (24 Aug 2024 21:01) (124/89 - 132/78)  BP(mean): --  RR: 18 (24 Aug 2024 21:01) (18 - 18)  SpO2: 99% (24 Aug 2024 21:01) (96% - 99%)    Parameters below as of 24 Aug 2024 21:01  Patient On (Oxygen Delivery Method): room air          PHYSICAL EXAM   Gen: well-appearing, in no acute distress  CV: pulse regularly present   Resp: airway patent, non-labored breathing  Abd: soft, non distended mild tender epigastric region    Daily    LABS:                        14.9   5.69  )-----------( 169      ( 24 Aug 2024 07:57 )             43.5     08-24    140  |  108  |  8   ----------------------------<  106<H>  3.9   |  26  |  0.89    Ca    9.1      24 Aug 2024 07:57    TPro  7.1  /  Alb  2.9<L>  /  TBili  1.3<H>  /  DBili  x   /  AST  31  /  ALT  125<H>  /  AlkPhos  90  08-24    PT/INR - ( 23 Aug 2024 05:06 )   PT: 12.7 sec;   INR: 1.13 ratio           Urinalysis Basic - ( 24 Aug 2024 07:57 )    Color: x / Appearance: x / SG: x / pH: x  Gluc: 106 mg/dL / Ketone: x  / Bili: x / Urobili: x   Blood: x / Protein: x / Nitrite: x   Leuk Esterase: x / RBC: x / WBC x   Sq Epi: x / Non Sq Epi: x / Bacteria: x

## 2024-08-25 NOTE — PROGRESS NOTE ADULT - ASSESSMENT
42 yo male with biliary pancreatitis,     s/p ERCP 8/20  LFTs trending down    - c/w diet  -Serial abd exams  -Plan for RA Lap carine 8/26  - preop 8/25  - trend LFTs    D/W Dr. Romo

## 2024-08-26 ENCOUNTER — TRANSCRIPTION ENCOUNTER (OUTPATIENT)
Age: 44
End: 2024-08-26

## 2024-08-26 LAB
ALBUMIN SERPL ELPH-MCNC: 3 G/DL — LOW (ref 3.3–5)
ALP SERPL-CCNC: 89 U/L — SIGNIFICANT CHANGE UP (ref 40–120)
ALT FLD-CCNC: 96 U/L — HIGH (ref 12–78)
ANION GAP SERPL CALC-SCNC: 4 MMOL/L — LOW (ref 5–17)
APTT BLD: 29.9 SEC — SIGNIFICANT CHANGE UP (ref 24.5–35.6)
AST SERPL-CCNC: 25 U/L — SIGNIFICANT CHANGE UP (ref 15–37)
BASOPHILS # BLD AUTO: 0.03 K/UL — SIGNIFICANT CHANGE UP (ref 0–0.2)
BASOPHILS NFR BLD AUTO: 0.6 % — SIGNIFICANT CHANGE UP (ref 0–2)
BILIRUB SERPL-MCNC: 0.6 MG/DL — SIGNIFICANT CHANGE UP (ref 0.2–1.2)
BUN SERPL-MCNC: 15 MG/DL — SIGNIFICANT CHANGE UP (ref 7–23)
CALCIUM SERPL-MCNC: 9.1 MG/DL — SIGNIFICANT CHANGE UP (ref 8.5–10.1)
CHLORIDE SERPL-SCNC: 106 MMOL/L — SIGNIFICANT CHANGE UP (ref 96–108)
CO2 SERPL-SCNC: 27 MMOL/L — SIGNIFICANT CHANGE UP (ref 22–31)
CREAT SERPL-MCNC: 1.02 MG/DL — SIGNIFICANT CHANGE UP (ref 0.5–1.3)
EGFR: 93 ML/MIN/1.73M2 — SIGNIFICANT CHANGE UP
EOSINOPHIL # BLD AUTO: 0.32 K/UL — SIGNIFICANT CHANGE UP (ref 0–0.5)
EOSINOPHIL NFR BLD AUTO: 6.1 % — HIGH (ref 0–6)
GLUCOSE SERPL-MCNC: 110 MG/DL — HIGH (ref 70–99)
HCT VFR BLD CALC: 41.6 % — SIGNIFICANT CHANGE UP (ref 39–50)
HGB BLD-MCNC: 14 G/DL — SIGNIFICANT CHANGE UP (ref 13–17)
IMM GRANULOCYTES NFR BLD AUTO: 0.2 % — SIGNIFICANT CHANGE UP (ref 0–0.9)
INR BLD: 1.06 RATIO — SIGNIFICANT CHANGE UP (ref 0.85–1.18)
LYMPHOCYTES # BLD AUTO: 1.25 K/UL — SIGNIFICANT CHANGE UP (ref 1–3.3)
LYMPHOCYTES # BLD AUTO: 23.9 % — SIGNIFICANT CHANGE UP (ref 13–44)
MAGNESIUM SERPL-MCNC: 1.9 MG/DL — SIGNIFICANT CHANGE UP (ref 1.6–2.6)
MCHC RBC-ENTMCNC: 28.3 PG — SIGNIFICANT CHANGE UP (ref 27–34)
MCHC RBC-ENTMCNC: 33.7 GM/DL — SIGNIFICANT CHANGE UP (ref 32–36)
MCV RBC AUTO: 84 FL — SIGNIFICANT CHANGE UP (ref 80–100)
MONOCYTES # BLD AUTO: 0.41 K/UL — SIGNIFICANT CHANGE UP (ref 0–0.9)
MONOCYTES NFR BLD AUTO: 7.8 % — SIGNIFICANT CHANGE UP (ref 2–14)
NEUTROPHILS # BLD AUTO: 3.22 K/UL — SIGNIFICANT CHANGE UP (ref 1.8–7.4)
NEUTROPHILS NFR BLD AUTO: 61.4 % — SIGNIFICANT CHANGE UP (ref 43–77)
PHOSPHATE SERPL-MCNC: 3.5 MG/DL — SIGNIFICANT CHANGE UP (ref 2.5–4.5)
PLATELET # BLD AUTO: 193 K/UL — SIGNIFICANT CHANGE UP (ref 150–400)
POTASSIUM SERPL-MCNC: 4.2 MMOL/L — SIGNIFICANT CHANGE UP (ref 3.5–5.3)
POTASSIUM SERPL-SCNC: 4.2 MMOL/L — SIGNIFICANT CHANGE UP (ref 3.5–5.3)
PROT SERPL-MCNC: 7.2 GM/DL — SIGNIFICANT CHANGE UP (ref 6–8.3)
PROTHROM AB SERPL-ACNC: 12 SEC — SIGNIFICANT CHANGE UP (ref 9.5–13)
RBC # BLD: 4.95 M/UL — SIGNIFICANT CHANGE UP (ref 4.2–5.8)
RBC # FLD: 12.9 % — SIGNIFICANT CHANGE UP (ref 10.3–14.5)
SODIUM SERPL-SCNC: 137 MMOL/L — SIGNIFICANT CHANGE UP (ref 135–145)
WBC # BLD: 5.24 K/UL — SIGNIFICANT CHANGE UP (ref 3.8–10.5)
WBC # FLD AUTO: 5.24 K/UL — SIGNIFICANT CHANGE UP (ref 3.8–10.5)

## 2024-08-26 PROCEDURE — 99232 SBSQ HOSP IP/OBS MODERATE 35: CPT

## 2024-08-26 PROCEDURE — 88304 TISSUE EXAM BY PATHOLOGIST: CPT | Mod: 26

## 2024-08-26 RX ORDER — OXYCODONE HYDROCHLORIDE 5 MG/1
10 TABLET ORAL ONCE
Refills: 0 | Status: DISCONTINUED | OUTPATIENT
Start: 2024-08-26 | End: 2024-08-26

## 2024-08-26 RX ORDER — ENOXAPARIN SODIUM 100 MG/ML
40 INJECTION SUBCUTANEOUS EVERY 24 HOURS
Refills: 0 | Status: DISCONTINUED | OUTPATIENT
Start: 2024-08-27 | End: 2024-08-27

## 2024-08-26 RX ORDER — LOSARTAN POTASSIUM 50 MG/1
1 TABLET ORAL
Qty: 30 | Refills: 0
Start: 2024-08-26 | End: 2024-09-24

## 2024-08-26 RX ORDER — OXYCODONE HYDROCHLORIDE 5 MG/1
5 TABLET ORAL ONCE
Refills: 0 | Status: DISCONTINUED | OUTPATIENT
Start: 2024-08-26 | End: 2024-08-26

## 2024-08-26 RX ORDER — OXYCODONE HYDROCHLORIDE 5 MG/1
5 TABLET ORAL DAILY
Refills: 0 | Status: DISCONTINUED | OUTPATIENT
Start: 2024-08-26 | End: 2024-08-27

## 2024-08-26 RX ORDER — NALOXONE HCL 1 MG/ML
1 VIAL (ML) INJECTION
Qty: 1 | Refills: 0
Start: 2024-08-26 | End: 2024-08-26

## 2024-08-26 RX ORDER — ONDANSETRON 2 MG/ML
4 INJECTION, SOLUTION INTRAMUSCULAR; INTRAVENOUS ONCE
Refills: 0 | Status: DISCONTINUED | OUTPATIENT
Start: 2024-08-26 | End: 2024-08-26

## 2024-08-26 RX ORDER — ACETAMINOPHEN 325 MG/1
1000 TABLET ORAL ONCE
Refills: 0 | Status: DISCONTINUED | OUTPATIENT
Start: 2024-08-26 | End: 2024-08-27

## 2024-08-26 RX ORDER — OXYCODONE HYDROCHLORIDE 5 MG/1
1 TABLET ORAL
Qty: 0 | Refills: 0 | DISCHARGE

## 2024-08-26 RX ORDER — OXYCODONE HYDROCHLORIDE 5 MG/1
1 TABLET ORAL
Qty: 20 | Refills: 0
Start: 2024-08-26 | End: 2024-08-30

## 2024-08-26 RX ORDER — THIAMINE HCL 250 MG
1 TABLET ORAL
Qty: 30 | Refills: 0
Start: 2024-08-26 | End: 2024-09-24

## 2024-08-26 RX ORDER — HYDRALAZINE HCL 50 MG
10 TABLET ORAL ONCE
Refills: 0 | Status: COMPLETED | OUTPATIENT
Start: 2024-08-26 | End: 2024-08-26

## 2024-08-26 RX ORDER — FENTANYL CITRATE 50 UG/ML
50 INJECTION INTRAMUSCULAR; INTRAVENOUS
Refills: 0 | Status: DISCONTINUED | OUTPATIENT
Start: 2024-08-26 | End: 2024-08-26

## 2024-08-26 RX ORDER — FOLIC ACID 1 MG
1 TABLET ORAL
Qty: 30 | Refills: 0
Start: 2024-08-26 | End: 2024-09-24

## 2024-08-26 RX ORDER — PANTOPRAZOLE SODIUM 40 MG
40 TABLET, DELAYED RELEASE (ENTERIC COATED) ORAL
Refills: 0 | Status: DISCONTINUED | OUTPATIENT
Start: 2024-08-26 | End: 2024-08-27

## 2024-08-26 RX ORDER — ONDANSETRON 2 MG/ML
4 INJECTION, SOLUTION INTRAMUSCULAR; INTRAVENOUS ONCE
Refills: 0 | Status: DISCONTINUED | OUTPATIENT
Start: 2024-08-26 | End: 2024-08-27

## 2024-08-26 RX ORDER — OXYCODONE AND ACETAMINOPHEN 7.5; 325 MG/1; MG/1
1 TABLET ORAL
Qty: 12 | Refills: 0
Start: 2024-08-26 | End: 2024-08-28

## 2024-08-26 RX ADMIN — FENTANYL CITRATE 50 MICROGRAM(S): 50 INJECTION INTRAMUSCULAR; INTRAVENOUS at 12:50

## 2024-08-26 RX ADMIN — OXYCODONE HYDROCHLORIDE 10 MILLIGRAM(S): 5 TABLET ORAL at 13:14

## 2024-08-26 RX ADMIN — OXYCODONE HYDROCHLORIDE 10 MILLIGRAM(S): 5 TABLET ORAL at 21:24

## 2024-08-26 RX ADMIN — SODIUM CHLORIDE 100 MILLILITER(S): 9 INJECTION INTRAMUSCULAR; INTRAVENOUS; SUBCUTANEOUS at 00:45

## 2024-08-26 RX ADMIN — OXYCODONE HYDROCHLORIDE 10 MILLIGRAM(S): 5 TABLET ORAL at 13:35

## 2024-08-26 RX ADMIN — Medication 2 MILLIGRAM(S): at 17:55

## 2024-08-26 RX ADMIN — Medication 2 MILLIGRAM(S): at 17:12

## 2024-08-26 RX ADMIN — FENTANYL CITRATE 50 MICROGRAM(S): 50 INJECTION INTRAMUSCULAR; INTRAVENOUS at 13:00

## 2024-08-26 RX ADMIN — Medication 10 MILLIGRAM(S): at 13:05

## 2024-08-26 RX ADMIN — OXYCODONE HYDROCHLORIDE 10 MILLIGRAM(S): 5 TABLET ORAL at 20:54

## 2024-08-26 RX ADMIN — Medication 75 MILLILITER(S): at 12:48

## 2024-08-26 RX ADMIN — PIPERACILLIN SODIUM AND TAZOBACTAM SODIUM 25 GRAM(S): 3; .375 INJECTION, POWDER, FOR SOLUTION INTRAVENOUS at 14:21

## 2024-08-26 RX ADMIN — LOSARTAN POTASSIUM 25 MILLIGRAM(S): 50 TABLET ORAL at 09:26

## 2024-08-26 RX ADMIN — PIPERACILLIN SODIUM AND TAZOBACTAM SODIUM 25 GRAM(S): 3; .375 INJECTION, POWDER, FOR SOLUTION INTRAVENOUS at 20:55

## 2024-08-26 RX ADMIN — Medication 10 MILLIGRAM(S): at 12:48

## 2024-08-26 RX ADMIN — PIPERACILLIN SODIUM AND TAZOBACTAM SODIUM 25 GRAM(S): 3; .375 INJECTION, POWDER, FOR SOLUTION INTRAVENOUS at 05:27

## 2024-08-26 NOTE — DISCHARGE NOTE PROVIDER - PROVIDER TOKENS
PROVIDER:[TOKEN:[[id number]]],PROVIDER:[TOKEN:[[id number]]],PROVIDER:[TOKEN:[[id number]]]

## 2024-08-26 NOTE — DISCHARGE NOTE PROVIDER - HOSPITAL COURSE
42 y/o male with a PMHx of gastritis, perianal fistula, HTN on current dietary /lifestyle modification who presented to   [facility]  [facility] c/o vomiting and abd pain43 y/o male with a PMHx of gastritis, perianal fistula, HTN on current dietary /lifestyle modification who presented to   [facility]  [facility] c/o vomiting and abd pain since yesterday.   Pt reports he ate around 11am yesterday and since then he has had abd pain and vomiting.  Pt denies fevers.   He denies cpain/SOB,  no urinary or resp complaints,  no edema/calf pain.  Pt reports he had some symptoms of abd pain 3 weeks ago and was advised by his provider to have imaging.  admitted to the hospitalist service for gallstone pancreatitis.     Gallstone pancreatitis   gallstone pancreatitis s/p ERCP with sphincterotomy 8/20. LFTs trending down  -RA lap carine today ADAT  GI and surgery consulted, recommendations appreciated.   S/P ERCP 8/20 ,  with sphincterotomy, stone removal, stent placement  Plan for Lap Carine On monday 8/26  Continue Clear liquid Diet.   Continue IV Zosyn   Continue pain control and supportive care  patient cleared by surg and GI for discharge       Thrombocytopenia likely consumptional resolved      HypertensionBradycardia on telemetry HR 40's - 70's,  asymptomatic   Episodes of Mobitz type 1 Heart Block  Mobitz I likely vagally mediated due to pain, nausea  Patient without any symptomatic bradycardia or high degree AV block  Normal LV function on echo  No EP intervention indicated at this time  DC Amlodipine , start losartan  EP consulted recommendations appreciated    HR 40- 70's overnight no pauses no further heart block      Hx of gastritis  Continue Protonix        - DVT proph: SCDS,  ambulation.   - Full Code        8/26-s/o robotic assisted cholecystectomy today, surgery cleared pt for discharge if pain controlled and tolerates po diet  .PHYSICAL EXAM:  Gen: No acute distress   HEENT:  pupils equal and reactive,    NECK:   supple, no carotid bruits, No JVD  CV:  +S1, +S2, regular rate rhythm, no murmurs or rubs  RESP:   lungs clear to auscultation bilaterally, no wheezing, rales, rhonchi, good air entry bilaterally  GI:  abdomen soft, non-distended, abd wall surgical dressing   MSK:   normal muscle tone, no atrophy, no rigidity, no contractions  EXT:  no clubbing, no cyanosis, no edema, no calf pain, swelling or erythema  VASCULAR:  pulses equal and symmetric in the upper and lower extremities  NEURO:  AAOX3, no focal neurological deficits, follows all commands, able to move extremities spontaneously  SKIN:  no ulcers, lesions or rashes    discharge 47mins  dw dr teresa denton today if tolerates po diet    42 y/o male with a PMHx of gastritis, perianal fistula, HTN on current dietary /lifestyle modification who presented to   [facility]  [facility] c/o vomiting and abd pain43 y/o male with a PMHx of gastritis, perianal fistula, HTN on current dietary /lifestyle modification who presented to   [facility]  [facility] c/o vomiting and abd pain since yesterday.   Pt reports he ate around 11am yesterday and since then he has had abd pain and vomiting.  Pt denies fevers.   He denies cpain/SOB,  no urinary or resp complaints,  no edema/calf pain.  Pt reports he had some symptoms of abd pain 3 weeks ago and was advised by his provider to have imaging.  admitted to the hospitalist service for gallstone pancreatitis.     Gallstone pancreatitis   gallstone pancreatitis s/p ERCP with sphincterotomy 8/20. LFTs trending down  RA cholecystectomy by dr moore today  GI and surgery consulted, recommendations appreciated.   S/P ERCP 8/20 ,  with sphincterotomy, stone removal, stent placement  stop abx    Continue pain control and supportive care  patient cleared by surg and GI for discharge       Thrombocytopenia likely consumptional resolved      HypertensionBradycardia on telemetry HR 40's - 70's,  asymptomatic   Episodes of Mobitz type 1 Heart Block  Mobitz I likely vagally mediated due to pain, nausea  Patient without any symptomatic bradycardia or high degree AV block  Normal LV function on echo  No EP intervention indicated at this time  DC Amlodipine , start losartan  EP consulted recommendations appreciated    HR 40- 70's overnight no pauses no further heart block      Hx of gastritis  Continue Protonix        - DVT proph: SCDS,  ambulation.   - Full Code        8/26-s/o robotic assisted cholecystectomy today, surgery cleared pt for discharge if pain controlled and tolerates po diet  .PHYSICAL EXAM:  Gen: No acute distress   HEENT:  pupils equal and reactive,    NECK:   supple, no carotid bruits, No JVD  CV:  +S1, +S2, regular rate rhythm, no murmurs or rubs  RESP:   lungs clear to auscultation bilaterally, no wheezing, rales, rhonchi, good air entry bilaterally  GI:  abdomen soft, non-distended, abd wall surgical dressing   MSK:   normal muscle tone, no atrophy, no rigidity, no contractions  EXT:  no clubbing, no cyanosis, no edema, no calf pain, swelling or erythema  VASCULAR:  pulses equal and symmetric in the upper and lower extremities  NEURO:  AAOX3, no focal neurological deficits, follows all commands, able to move extremities spontaneously  SKIN:  no ulcers, lesions or rashes    discharge 47mins  dw dr teresa denton today if tolerates po diet    44 y/o male with a PMHx of gastritis, perianal fistula, HTN on current dietary /lifestyle modification who presented to   [facility]  ED c/o vomiting and abd pain43 y/o male with a PMHx of gastritis, perianal fistula, HTN on current dietary /lifestyle modification who presented to   [facility]  [facility] c/o vomiting and abd pain since yesterday.   Pt reports he ate around 11am yesterday and since then he has had abd pain and vomiting.  Pt denies fevers.   He denies cpain/SOB,  no urinary or resp complaints,  no edema/calf pain.  Pt reports he had some symptoms of abd pain 3 weeks ago and was advised by his provider to have imaging.  admitted to the hospitalist service for gallstone pancreatitis.     Gallstone pancreatitis   gallstone pancreatitis s/p ERCP with sphincterotomy 8/20. LFTs trending down  RA cholecystectomy by dr moore today  GI and surgery consulted, recommendations appreciated.   S/P ERCP 8/20 ,  with sphincterotomy, stone removal, stent placement  stop abx    Continue pain control and supportive care  patient cleared by surg and GI for discharge ..      Thrombocytopenia likely consumptional resolved      HypertensionBradycardia on telemetry HR 40's - 70's,  asymptomatic   Episodes of Mobitz type 1 Heart Block  Mobitz I likely vagally mediated due to pain, nausea  Patient without any symptomatic bradycardia or high degree AV block  Normal LV function on echo  No EP intervention indicated at this time  DC Amlodipine , start losartan  EP consulted recommendations appreciated    HR 40- 70's overnight no pauses no further heart block      Hx of gastritis  Continue Protonix        - DVT proph: SCDS,  ambulation.   - Full Code        8/26-s/o robotic assisted cholecystectomy today, surgery cleared pt for discharge if pain controlled and tolerates po diet  .PHYSICAL EXAM:  Gen: No acute distress   HEENT:  pupils equal and reactive,    NECK:   supple, no carotid bruits, No JVD  CV:  +S1, +S2, regular rate rhythm, no murmurs or rubs  RESP:   lungs clear to auscultation bilaterally, no wheezing, rales, rhonchi, good air entry bilaterally  GI:  abdomen soft, non-distended, abd wall surgical dressing   MSK:   normal muscle tone, no atrophy, no rigidity, no contractions  EXT:  no clubbing, no cyanosis, no edema, no calf pain, swelling or erythema  VASCULAR:  pulses equal and symmetric in the upper and lower extremities  NEURO:  AAOX3, no focal neurological deficits, follows all commands, able to move extremities spontaneously  SKIN:  no ulcers, lesions or rashes    discharge 47mins  dw dr teresa denton today if tolerates po diet

## 2024-08-26 NOTE — DISCHARGE NOTE PROVIDER - NSDCCPCAREPLAN_GEN_ALL_CORE_FT
PRINCIPAL DISCHARGE DIAGNOSIS  Diagnosis: Pancreatitis  Assessment and Plan of Treatment: please follow up with surgery Dr Jeffrey in 1 week or as scheduled after discharge  you underwent robotic  gall bladder removal surgery and unerwent ERCP with sphincterotomy and biliary stent placement -follow up with GI Dr moulton - Dr moulton office will call you in 2 weeks or so , if you dont receive a call by then please call their office   follow up with cardiology in 2 weeks for sinus bradycardia 40beats per minute to 70beats per min        SECONDARY DISCHARGE DIAGNOSES  Diagnosis: Gallstones  Assessment and Plan of Treatment:     Diagnosis: Elevated liver enzymes  Assessment and Plan of Treatment:     Diagnosis: Bradycardia  Assessment and Plan of Treatment:

## 2024-08-26 NOTE — PROGRESS NOTE ADULT - ASSESSMENT
s/p ERCP 8/20  LFTs trending down    - c/w diet  -Serial abd exams  -Plan for RA Lap carine 8/26  - preop 8/25  - trend LFTs    D/W Dr. Romo     A/P: 43y male with gallstone pancreatitis s/p ERCP with sphincterotomy 8/20. LFTs trending down  -RA lap carine today   -remain NPO  ADAT  Strict I&O's  Pain controlled  DVT prophylaxis/OOB  Encourage Incentive spirometry    -Instructions reviewed with patient who reports his understanding. Patient discussed with Dr. Romo

## 2024-08-26 NOTE — DISCHARGE NOTE PROVIDER - NSDCMRMEDTOKEN_GEN_ALL_CORE_FT
folic acid 1 mg oral tablet: 1 tab(s) orally once a day  losartan 25 mg oral tablet: 1 tab(s) orally once a day  omeprazole 20 mg oral delayed release capsule: 1 cap(s) orally once a day as needed for acid reflux  Percocet 5 mg-325 mg oral tablet: 1 tab(s) orally every 6 hours MDD: 4  thiamine 100 mg oral tablet: 1 tab(s) orally once a day   folic acid 1 mg oral tablet: 1 tab(s) orally once a day  losartan 25 mg oral tablet: 1 tab(s) orally once a day  omeprazole 20 mg oral delayed release capsule: 1 cap(s) orally once a day as needed for acid reflux  oxyCODONE 5 mg oral tablet: 1 tab(s) orally every 6 hours as needed for moderate to severe surgical site pain  thiamine 100 mg oral tablet: 1 tab(s) orally once a day   folic acid 1 mg oral tablet: 1 tab(s) orally once a day  losartan 25 mg oral tablet: 1 tab(s) orally once a day  Narcan 4 mg/0.1 mL nasal spray: 1 spray(s) intranasally once as needed for opioid overdose  omeprazole 20 mg oral delayed release capsule: 1 cap(s) orally once a day as needed for acid reflux  oxyCODONE 5 mg oral tablet: 1 tab(s) orally every 6 hours as needed for moderate to severe surgical site pain MDD: 4 tabs  thiamine 100 mg oral tablet: 1 tab(s) orally once a day

## 2024-08-26 NOTE — BRIEF OPERATIVE NOTE - OPERATION/FINDINGS
See dictated note
EUS with cbd stone, GB full of sludge  ERCp with sphincterotomy, stone removal, stent placement.

## 2024-08-26 NOTE — DISCHARGE NOTE PROVIDER - CARE PROVIDER_API CALL
Buddy Wyman  Gastroenterology  [address], [place] [address]  Phone: [phone]  Fax: [phone]  Follow Up Time:     Duglas Jeffrey  Surgery  [address], [address]  [place] [address]  Phone: [phone]  Fax: [phone]  Follow Up Time:     Colyb Rose  Cardiovascular Disease  [address], [address]  [place] [address]  Phone: [phone]  Fax: [phone]  Follow Up Time:

## 2024-08-26 NOTE — PROGRESS NOTE ADULT - SUBJECTIVE AND OBJECTIVE BOX
Patient denies any complaints. He is NPO and eager for surgery. Remains with intermittent epigastric discomfort.       Vital Signs Last 24 Hrs  T(C): 36.2 (25 Aug 2024 21:04), Max: 36.2 (25 Aug 2024 21:04)  T(F): 97.2 (25 Aug 2024 21:04), Max: 97.2 (25 Aug 2024 21:04)  HR: 51 (25 Aug 2024 21:04) (51 - 51)  BP: 129/75 (25 Aug 2024 21:04) (129/75 - 129/75)  BP(mean): --  RR: 18 (25 Aug 2024 21:04) (18 - 18)  SpO2: 98% (25 Aug 2024 21:04) (98% - 98%)    Parameters below as of 25 Aug 2024 21:04  Patient On (Oxygen Delivery Method): room air        Physical Exam  Gen: NAD, comfortable in bed  Neuro: A&Ox3  Lungs: CTAB  CV:  RR  Abd: Soft, ND   Extremities: Venodynes in place. No LE edema bl                          14.0   5.24  )-----------( 193      ( 26 Aug 2024 05:08 )             41.6       08-26    137  |  106  |  15  ----------------------------<  110<H>  4.2   |  27  |  1.02    Ca    9.1      26 Aug 2024 05:08  Phos  3.5     08-26  Mg     1.9     08-26    TPro  7.2  /  Alb  3.0<L>  /  TBili  0.6  /  DBili  x   /  AST  25  /  ALT  96<H>  /  AlkPhos  89  08-26      A/P: 43y  Male  with gallstone pancreatitis s/p ERCP with sphincterotomy 8/20.  -RA lap carine today   -remain NPO  ADAT  Strict I&O's  Pain controlled  DVT prophylaxis/OOB  Encourage Incentive spirometry    -Instructions reviewed with patient who reports his understanding. Patient discussed with ***     Patient denies any complaints. He is NPO and eager for surgery. Remains with intermittent epigastric discomfort.       Vital Signs Last 24 Hrs  T(C): 36.2 (25 Aug 2024 21:04), Max: 36.2 (25 Aug 2024 21:04)  T(F): 97.2 (25 Aug 2024 21:04), Max: 97.2 (25 Aug 2024 21:04)  HR: 51 (25 Aug 2024 21:04) (51 - 51)  BP: 129/75 (25 Aug 2024 21:04) (129/75 - 129/75)  BP(mean): --  RR: 18 (25 Aug 2024 21:04) (18 - 18)  SpO2: 98% (25 Aug 2024 21:04) (98% - 98%)    Parameters below as of 25 Aug 2024 21:04  Patient On (Oxygen Delivery Method): room air        Physical Exam  Gen: NAD, comfortable in bed  Neuro: A&Ox3  Lungs: CTAB  CV:  RR  Abd: Soft, ND   Extremities: Venodynes in place. No LE edema bl                          14.0   5.24  )-----------( 193      ( 26 Aug 2024 05:08 )             41.6       08-26    137  |  106  |  15  ----------------------------<  110<H>  4.2   |  27  |  1.02    Ca    9.1      26 Aug 2024 05:08  Phos  3.5     08-26  Mg     1.9     08-26    TPro  7.2  /  Alb  3.0<L>  /  TBili  0.6  /  DBili  x   /  AST  25  /  ALT  96<H>  /  AlkPhos  89  08-26

## 2024-08-27 ENCOUNTER — TRANSCRIPTION ENCOUNTER (OUTPATIENT)
Age: 44
End: 2024-08-27

## 2024-08-27 VITALS
DIASTOLIC BLOOD PRESSURE: 95 MMHG | RESPIRATION RATE: 18 BRPM | TEMPERATURE: 97 F | OXYGEN SATURATION: 100 % | SYSTOLIC BLOOD PRESSURE: 145 MMHG | HEART RATE: 56 BPM

## 2024-08-27 DIAGNOSIS — K85.10 BILIARY ACUTE PANCREATITIS WITHOUT NECROSIS OR INFECTION: ICD-10-CM

## 2024-08-27 LAB
ALBUMIN SERPL ELPH-MCNC: 3.2 G/DL — LOW (ref 3.3–5)
ALP SERPL-CCNC: 95 U/L — SIGNIFICANT CHANGE UP (ref 40–120)
ALT FLD-CCNC: 122 U/L — HIGH (ref 12–78)
ANION GAP SERPL CALC-SCNC: 6 MMOL/L — SIGNIFICANT CHANGE UP (ref 5–17)
AST SERPL-CCNC: 60 U/L — HIGH (ref 15–37)
BASOPHILS # BLD AUTO: 0.03 K/UL — SIGNIFICANT CHANGE UP (ref 0–0.2)
BASOPHILS NFR BLD AUTO: 0.3 % — SIGNIFICANT CHANGE UP (ref 0–2)
BILIRUB DIRECT SERPL-MCNC: 0.3 MG/DL — SIGNIFICANT CHANGE UP (ref 0–0.3)
BILIRUB INDIRECT FLD-MCNC: 0.8 MG/DL — SIGNIFICANT CHANGE UP (ref 0.2–1)
BILIRUB SERPL-MCNC: 1.1 MG/DL — SIGNIFICANT CHANGE UP (ref 0.2–1.2)
BUN SERPL-MCNC: 12 MG/DL — SIGNIFICANT CHANGE UP (ref 7–23)
CALCIUM SERPL-MCNC: 9.2 MG/DL — SIGNIFICANT CHANGE UP (ref 8.5–10.1)
CHLORIDE SERPL-SCNC: 105 MMOL/L — SIGNIFICANT CHANGE UP (ref 96–108)
CO2 SERPL-SCNC: 29 MMOL/L — SIGNIFICANT CHANGE UP (ref 22–31)
CREAT SERPL-MCNC: 1.07 MG/DL — SIGNIFICANT CHANGE UP (ref 0.5–1.3)
EGFR: 88 ML/MIN/1.73M2 — SIGNIFICANT CHANGE UP
EOSINOPHIL # BLD AUTO: 0.11 K/UL — SIGNIFICANT CHANGE UP (ref 0–0.5)
EOSINOPHIL NFR BLD AUTO: 1.2 % — SIGNIFICANT CHANGE UP (ref 0–6)
GLUCOSE SERPL-MCNC: 99 MG/DL — SIGNIFICANT CHANGE UP (ref 70–99)
HCT VFR BLD CALC: 44.3 % — SIGNIFICANT CHANGE UP (ref 39–50)
HGB BLD-MCNC: 14.9 G/DL — SIGNIFICANT CHANGE UP (ref 13–17)
IMM GRANULOCYTES NFR BLD AUTO: 0.2 % — SIGNIFICANT CHANGE UP (ref 0–0.9)
LYMPHOCYTES # BLD AUTO: 1.59 K/UL — SIGNIFICANT CHANGE UP (ref 1–3.3)
LYMPHOCYTES # BLD AUTO: 17.6 % — SIGNIFICANT CHANGE UP (ref 13–44)
MCHC RBC-ENTMCNC: 28.4 PG — SIGNIFICANT CHANGE UP (ref 27–34)
MCHC RBC-ENTMCNC: 33.6 GM/DL — SIGNIFICANT CHANGE UP (ref 32–36)
MCV RBC AUTO: 84.5 FL — SIGNIFICANT CHANGE UP (ref 80–100)
MONOCYTES # BLD AUTO: 0.75 K/UL — SIGNIFICANT CHANGE UP (ref 0–0.9)
MONOCYTES NFR BLD AUTO: 8.3 % — SIGNIFICANT CHANGE UP (ref 2–14)
NEUTROPHILS # BLD AUTO: 6.54 K/UL — SIGNIFICANT CHANGE UP (ref 1.8–7.4)
NEUTROPHILS NFR BLD AUTO: 72.4 % — SIGNIFICANT CHANGE UP (ref 43–77)
PLATELET # BLD AUTO: 233 K/UL — SIGNIFICANT CHANGE UP (ref 150–400)
POTASSIUM SERPL-MCNC: 3.8 MMOL/L — SIGNIFICANT CHANGE UP (ref 3.5–5.3)
POTASSIUM SERPL-SCNC: 3.8 MMOL/L — SIGNIFICANT CHANGE UP (ref 3.5–5.3)
PROT SERPL-MCNC: 7.4 GM/DL — SIGNIFICANT CHANGE UP (ref 6–8.3)
RBC # BLD: 5.24 M/UL — SIGNIFICANT CHANGE UP (ref 4.2–5.8)
RBC # FLD: 13 % — SIGNIFICANT CHANGE UP (ref 10.3–14.5)
SODIUM SERPL-SCNC: 140 MMOL/L — SIGNIFICANT CHANGE UP (ref 135–145)
WBC # BLD: 9.04 K/UL — SIGNIFICANT CHANGE UP (ref 3.8–10.5)
WBC # FLD AUTO: 9.04 K/UL — SIGNIFICANT CHANGE UP (ref 3.8–10.5)

## 2024-08-27 PROCEDURE — 99239 HOSP IP/OBS DSCHRG MGMT >30: CPT

## 2024-08-27 RX ADMIN — Medication 100 MILLIGRAM(S): at 10:12

## 2024-08-27 RX ADMIN — Medication 40 MILLIGRAM(S): at 05:18

## 2024-08-27 RX ADMIN — OXYCODONE HYDROCHLORIDE 5 MILLIGRAM(S): 5 TABLET ORAL at 00:30

## 2024-08-27 RX ADMIN — LOSARTAN POTASSIUM 25 MILLIGRAM(S): 50 TABLET ORAL at 10:12

## 2024-08-27 RX ADMIN — Medication 1 TABLET(S): at 10:12

## 2024-08-27 RX ADMIN — OXYCODONE HYDROCHLORIDE 5 MILLIGRAM(S): 5 TABLET ORAL at 00:00

## 2024-08-27 RX ADMIN — Medication 1 MILLIGRAM(S): at 10:12

## 2024-08-27 RX ADMIN — ENOXAPARIN SODIUM 40 MILLIGRAM(S): 100 INJECTION SUBCUTANEOUS at 10:13

## 2024-08-27 NOTE — PROGRESS NOTE ADULT - REASON FOR ADMISSION
abdominal pain  pancreatitis
no

## 2024-08-27 NOTE — PROGRESS NOTE ADULT - SUBJECTIVE AND OBJECTIVE BOX
Post Op Day#: 1  Procedure:  Laparoscopic robotic assisted cholecystectomy     The patient is doing well without complaints, afebrile,. stable, pain controlled, tolerating diet .     Vital Signs Last 24 Hrs  T(C): 36.3 (27 Aug 2024 07:32), Max: 37.1 (26 Aug 2024 20:22)  T(F): 97.3 (27 Aug 2024 07:32), Max: 98.8 (26 Aug 2024 20:22)  HR: 56 (27 Aug 2024 07:32) (56 - 74)  BP: 145/95 (27 Aug 2024 07:32) (140/82 - 183/112)  BP(mean): --  RR: 18 (27 Aug 2024 07:32) (15 - 25)  SpO2: 100% (27 Aug 2024 07:32) (97% - 100%)    Parameters below as of 27 Aug 2024 07:32  Patient On (Oxygen Delivery Method): room air        PHYSICAL EXAM:  General: NAD.  HEENT: no JVD, no jaundice.  LUNGS: CTAB.  Heart: S1 S2 RRR  Abd: soft nt/nd   Wounds: clean dry and intact                          14.9   9.04  )-----------( 233      ( 27 Aug 2024 06:20 )             44.3       08-27    140  |  105  |  12  ----------------------------<  99  3.8   |  29  |  1.07    Ca    9.2      27 Aug 2024 06:20  Phos  3.5     08-26  Mg     1.9     08-26    TPro  7.4  /  Alb  3.2<L>  /  TBili  1.1  /  DBili  0.3  /  AST  60<H>  /  ALT  122<H>  /  AlkPhos  95  08-27      PT/INR - ( 26 Aug 2024 05:08 )   PT: 12.0 sec;   INR: 1.06 ratio         PTT - ( 26 Aug 2024 05:08 )  PTT:29.9 sec

## 2024-08-27 NOTE — PROGRESS NOTE ADULT - SUBJECTIVE AND OBJECTIVE BOX
44 y/o male with a PMHx of gastritis, perianal fistula, HTN on current dietary /lifestyle modification who presented to   [facility]  ED c/o vomiting and abd pain43 y/o male with a PMHx of gastritis, perianal fistula, HTN on current dietary /lifestyle modification who presented to   [facility]  ED c/o vomiting and abd pain since yesterday.   Pt reports he ate around 11am yesterday and since then he has had abd pain and vomiting.  Pt denies fevers.   He denies cpain/SOB,  no urinary or resp complaints,  no edema/calf pain.  Pt reports he had some symptoms of abd pain 3 weeks ago and was advised by his provider to have imaging.  admitted to the hospitalist service for gallstone pancreatitis.     8/27-s/o robotic assisted cholecystectomy today, tolerated po, abd pain controlled   .PHYSICAL EXAM:  Gen: No acute distress   HEENT:  pupils equal and reactive,    NECK:   supple, no carotid bruits, No JVD  CV:  +S1, +S2, regular rate rhythm, no murmurs or rubsRESP:   lungs clear to auscultation bilaterally, no wheezing, rales, rhonchi, good air entry bilaterally  GI:  abdomen soft, non-distended, abd wall surgical dressing   MSK:   normal muscle tone, no atrophy, no rigidity, no contractions  EXT:  no clubbing, no cyanosis, no edema, no calf pain, swelling or erythema  VASCULAR:  pulses equal and symmetric in the upper and lower extremities  NEURO:  AAOX3, no focal neurological deficits, follows all commands, able to move extremities spontaneously  SKIN:  no ulcers, lesions or rashes

## 2024-08-27 NOTE — DISCHARGE NOTE NURSING/CASE MANAGEMENT/SOCIAL WORK - PATIENT PORTAL LINK FT
You can access the FollowMyHealth Patient Portal offered by [facility] by registering at the following website: [email or web address]. By joining [employer]’s FollowMyHealth portal, you will also be able to view your health information using other applications (apps) compatible with our system.

## 2024-08-27 NOTE — PROGRESS NOTE ADULT - ASSESSMENT
Gallstone pancreatitis   gallstone pancreatitis s/p ERCP with sphincterotomy 8/20. LFTs trending down  RA cholecystectomy by dr moore today  GI and surgery consulted, recommendations appreciated.   S/P ERCP 8/20 ,  with sphincterotomy, stone removal, stent placement  stop abx    Continue pain control and supportive care  patient cleared by surg and GI for discharge ..      Thrombocytopenia likely consumptional resolved HypertensionBradycardia on telemetry HR 40's - 70's,  asymptomatic   Episodes of Mobitz type 1 Heart Block  Mobitz I likely vagally mediated due to pain, nausea  Patient without any symptomatic bradycardia or high degree AV block  Normal LV function on echo  No EP intervention indicated at this time  DC Amlodipine , start losartan  EP consulted recommendations appreciated    HR 40- 70's overnight no pauses no further heart block      Hx of gastritis  Continue Protonix DVT proph: SCDS,  ambulation.   - Full Code        discharge time 47mins

## 2024-08-27 NOTE — PROGRESS NOTE ADULT - PROBLEM SELECTOR PLAN 1
The patient is s/p laparoscopic robotic assisted cholecystectomy and doing very well.  All discharge instructions were given to the patient, as well as potential signs of complications.  The patient will follow up in 2 weeks.  [facility]

## 2024-08-27 NOTE — PROGRESS NOTE ADULT - PROVIDER SPECIALTY LIST ADULT
Hospitalist
Surgery

## 2024-08-27 NOTE — DISCHARGE NOTE NURSING/CASE MANAGEMENT/SOCIAL WORK - NSPROMEDSBROUGHTTOHOSP_GEN_A_NUR
Patient last seen on 05/11/21,, and was to return in 6 months no  appointment pending at this time.  Last refill done on 10/05/21 states discontinue alternative therapy.  Script set to E prescribe pending doctor's approval.  Please advise.            no

## 2024-09-04 PROBLEM — Z00.00 ENCOUNTER FOR PREVENTIVE HEALTH EXAMINATION: Status: ACTIVE | Noted: 2024-09-04

## 2024-09-04 LAB — SURGICAL PATHOLOGY STUDY: SIGNIFICANT CHANGE UP

## 2024-09-05 PROBLEM — K60.3 ANAL FISTULA: Chronic | Status: ACTIVE | Noted: 2024-08-19

## 2024-09-05 PROBLEM — K29.70 GASTRITIS, UNSPECIFIED, WITHOUT BLEEDING: Chronic | Status: ACTIVE | Noted: 2024-08-19

## 2024-10-09 ENCOUNTER — APPOINTMENT (OUTPATIENT)
Dept: GASTROENTEROLOGY | Facility: CLINIC | Age: 44
End: 2024-10-09